# Patient Record
Sex: FEMALE | Race: BLACK OR AFRICAN AMERICAN | Employment: FULL TIME | URBAN - METROPOLITAN AREA
[De-identification: names, ages, dates, MRNs, and addresses within clinical notes are randomized per-mention and may not be internally consistent; named-entity substitution may affect disease eponyms.]

---

## 2019-04-03 PROBLEM — E66.09 CLASS 1 OBESITY DUE TO EXCESS CALORIES WITHOUT SERIOUS COMORBIDITY WITH BODY MASS INDEX (BMI) OF 30.0 TO 30.9 IN ADULT: Status: ACTIVE | Noted: 2019-04-03

## 2019-06-07 ENCOUNTER — HOSPITAL ENCOUNTER (OUTPATIENT)
Dept: MAMMOGRAPHY | Age: 59
Discharge: HOME | End: 2019-06-07
Attending: FAMILY MEDICINE

## 2019-06-07 DIAGNOSIS — Z12.39 SCREENING FOR MALIGNANT NEOPLASM OF BREAST: ICD-10-CM

## 2020-07-31 ENCOUNTER — HOSPITAL ENCOUNTER (OUTPATIENT)
Dept: ULTRASOUND IMAGING | Age: 60
Discharge: HOME | End: 2020-07-31
Attending: FAMILY MEDICINE

## 2020-07-31 DIAGNOSIS — R11.0 NAUSEA: ICD-10-CM

## 2020-07-31 DIAGNOSIS — R10.11 RUQ PAIN: ICD-10-CM

## 2022-03-18 PROBLEM — E66.811 CLASS 1 OBESITY DUE TO EXCESS CALORIES WITHOUT SERIOUS COMORBIDITY WITH BODY MASS INDEX (BMI) OF 30.0 TO 30.9 IN ADULT: Status: ACTIVE | Noted: 2019-04-03

## 2022-03-18 PROBLEM — E66.09 CLASS 1 OBESITY DUE TO EXCESS CALORIES WITHOUT SERIOUS COMORBIDITY WITH BODY MASS INDEX (BMI) OF 30.0 TO 30.9 IN ADULT: Status: ACTIVE | Noted: 2019-04-03

## 2022-06-24 RX ORDER — ESCITALOPRAM OXALATE 20 MG/1
20 TABLET ORAL DAILY
Qty: 30 TABLET | Refills: 0 | Status: SHIPPED | OUTPATIENT
Start: 2022-06-24

## 2023-01-06 ENCOUNTER — OFFICE VISIT (OUTPATIENT)
Dept: FAMILY MEDICINE CLINIC | Facility: CLINIC | Age: 63
End: 2023-01-06
Payer: COMMERCIAL

## 2023-01-06 VITALS
WEIGHT: 190.7 LBS | SYSTOLIC BLOOD PRESSURE: 136 MMHG | OXYGEN SATURATION: 98 % | BODY MASS INDEX: 31.77 KG/M2 | DIASTOLIC BLOOD PRESSURE: 86 MMHG | HEART RATE: 98 BPM | RESPIRATION RATE: 16 BRPM | TEMPERATURE: 98 F | HEIGHT: 65 IN

## 2023-01-06 DIAGNOSIS — J01.00 ACUTE MAXILLARY SINUSITIS, RECURRENCE NOT SPECIFIED: ICD-10-CM

## 2023-01-06 DIAGNOSIS — R05.3 PERSISTENT COUGH: Primary | ICD-10-CM

## 2023-01-06 LAB
EXP DATE SOLUTION: NORMAL
EXP DATE SWAB: NORMAL
EXPIRATION DATE: NORMAL
INFLUENZA A ANTIGEN, POC: NEGATIVE
INFLUENZA B ANTIGEN, POC: NEGATIVE
LOT NUMBER POC: NORMAL
LOT NUMBER SOLUTION: NORMAL
LOT NUMBER SWAB: NORMAL
SARS-COV-2 RNA, POC: NEGATIVE
VALID INTERNAL CONTROL, POC: YES

## 2023-01-06 PROCEDURE — 87635 SARS-COV-2 COVID-19 AMP PRB: CPT | Performed by: PHYSICIAN ASSISTANT

## 2023-01-06 PROCEDURE — 99213 OFFICE O/P EST LOW 20 MIN: CPT | Performed by: PHYSICIAN ASSISTANT

## 2023-01-06 PROCEDURE — 87804 INFLUENZA ASSAY W/OPTIC: CPT | Performed by: PHYSICIAN ASSISTANT

## 2023-01-06 RX ORDER — CEFDINIR 300 MG/1
300 CAPSULE ORAL 2 TIMES DAILY
Qty: 20 CAPSULE | Refills: 0 | Status: SHIPPED | OUTPATIENT
Start: 2023-01-06 | End: 2023-01-16

## 2023-01-06 RX ORDER — DEXTROMETHORPHAN HYDROBROMIDE AND PROMETHAZINE HYDROCHLORIDE 15; 6.25 MG/5ML; MG/5ML
5 SYRUP ORAL 4 TIMES DAILY PRN
Qty: 240 ML | Refills: 0 | Status: SHIPPED | OUTPATIENT
Start: 2023-01-06

## 2023-01-06 RX ORDER — ALBUTEROL SULFATE 90 UG/1
2 AEROSOL, METERED RESPIRATORY (INHALATION) EVERY 6 HOURS PRN
Qty: 1 EACH | Refills: 1 | Status: SHIPPED | OUTPATIENT
Start: 2023-01-06

## 2023-01-06 ASSESSMENT — PATIENT HEALTH QUESTIONNAIRE - PHQ9
3. TROUBLE FALLING OR STAYING ASLEEP: 0
2. FEELING DOWN, DEPRESSED OR HOPELESS: 0
SUM OF ALL RESPONSES TO PHQ QUESTIONS 1-9: 0
SUM OF ALL RESPONSES TO PHQ QUESTIONS 1-9: 0
1. LITTLE INTEREST OR PLEASURE IN DOING THINGS: 0
SUM OF ALL RESPONSES TO PHQ QUESTIONS 1-9: 0
4. FEELING TIRED OR HAVING LITTLE ENERGY: 0
SUM OF ALL RESPONSES TO PHQ QUESTIONS 1-9: 0
7. TROUBLE CONCENTRATING ON THINGS, SUCH AS READING THE NEWSPAPER OR WATCHING TELEVISION: 0
10. IF YOU CHECKED OFF ANY PROBLEMS, HOW DIFFICULT HAVE THESE PROBLEMS MADE IT FOR YOU TO DO YOUR WORK, TAKE CARE OF THINGS AT HOME, OR GET ALONG WITH OTHER PEOPLE: 0
8. MOVING OR SPEAKING SO SLOWLY THAT OTHER PEOPLE COULD HAVE NOTICED. OR THE OPPOSITE, BEING SO FIGETY OR RESTLESS THAT YOU HAVE BEEN MOVING AROUND A LOT MORE THAN USUAL: 0
5. POOR APPETITE OR OVEREATING: 0
9. THOUGHTS THAT YOU WOULD BE BETTER OFF DEAD, OR OF HURTING YOURSELF: 0
SUM OF ALL RESPONSES TO PHQ9 QUESTIONS 1 & 2: 0
6. FEELING BAD ABOUT YOURSELF - OR THAT YOU ARE A FAILURE OR HAVE LET YOURSELF OR YOUR FAMILY DOWN: 0

## 2023-01-06 ASSESSMENT — ANXIETY QUESTIONNAIRES
IF YOU CHECKED OFF ANY PROBLEMS ON THIS QUESTIONNAIRE, HOW DIFFICULT HAVE THESE PROBLEMS MADE IT FOR YOU TO DO YOUR WORK, TAKE CARE OF THINGS AT HOME, OR GET ALONG WITH OTHER PEOPLE: NOT DIFFICULT AT ALL
5. BEING SO RESTLESS THAT IT IS HARD TO SIT STILL: 0
7. FEELING AFRAID AS IF SOMETHING AWFUL MIGHT HAPPEN: 0
2. NOT BEING ABLE TO STOP OR CONTROL WORRYING: 0
6. BECOMING EASILY ANNOYED OR IRRITABLE: 0
4. TROUBLE RELAXING: 0
3. WORRYING TOO MUCH ABOUT DIFFERENT THINGS: 0
GAD7 TOTAL SCORE: 0
1. FEELING NERVOUS, ANXIOUS, OR ON EDGE: 0

## 2023-01-06 ASSESSMENT — ENCOUNTER SYMPTOMS
SORE THROAT: 0
SINUS PRESSURE: 1
EYE DISCHARGE: 0
SINUS PAIN: 1
SHORTNESS OF BREATH: 0
CHEST TIGHTNESS: 1
COUGH: 1
WHEEZING: 0
RHINORRHEA: 1

## 2023-01-06 NOTE — PATIENT INSTRUCTIONS
*Take the antibiotic (Cefdinir) as prescribed. I recommend Tylenol for fever or pain. Take the Promethazine DM for cough and congestion. Consider use of over the counter Flonase - 2 sprays per nostril once a day. If sore throat is present, may mix 1/2 to 1 tsp of table salt in 8oz of warm water and gargle every 1-2 hours. Do not swallow the salt water, it could cause nausea. Make sure to wash hands frequently. Please return if symptoms don't improve or if worsening symptoms such as shortness of breath, fever > 102 or if feeling much worse. *You can try the albuterol inhaler as needed for cough and tightness in the chest.  Take 2 puffs every 6 hours as needed.

## 2023-01-06 NOTE — LETTER
[facility]  [address] [address]  Phone: [phone]  Fax: [phone]    Eugenio Magaña PA-C        January 6, 2023     Patient: Jaswinder Perkins   YOB: 1960   Date of Visit: 1/6/2023       To Whom It May Concern: It is my medical opinion that [facility] may return to work on 1/6/23. Please excuse for time missed due to illness. If you have any questions or concerns, please don't hesitate to call.     Sincerely,        Eugenio Magaña PA-C

## 2023-01-06 NOTE — PROGRESS NOTES
[facility] [address]  Phone [phone]      Patient: Daren Bearden  YOB: 1960  Age 58 y.o. Sex female  Medical Record:  [id number]  Visit Date: 01/06/23  Author:  Jason Badillo PA-C    Family Practice Clinic Note    Chief Complaint   Patient presents with    Cough    Congestion     Started about 2 weeks before Danielle. Went to ED ([address] [facility]) got better then went to [place] and came back sick. No flu or covid test    Other     Pt requesting an inhaler       History of Present Illness  This is a 70-year-old female who presents today with complaints of respiratory symptoms which been present now for approximately 5 weeks. She states that she was seen at emergency room 11/28/2022 when the symptoms first started. She had a sore throat at that time and was treated for suspected strep pharyngitis. She took the penicillin that was prescribed and notes that her sore throat improved but the rest of her respiratory symptoms persisted. She is continue to have nasal congestion, rhinorrhea, postnasal drip, sinus pressure and is experiencing worsening sinus pain. Denies bloody nasal discharge. No ear pain or discharge from the [address] Denies fever or chills. She denies chest congestion but has had cough which she attributes to postnasal drip. Occasionally productive. She denies shortness of breath or wheeze but states that she does get a little \"tight\" with her cough. She is requesting an albuterol inhaler. Denies history of asthma or COPD. Does not smoke.     Past History:    Past Medical history   Past Medical History:   Diagnosis Date    Depression     GERD (gastroesophageal reflux disease)     Migraines, neuralgic        Current Problem List:   Patient Active Problem List   Diagnosis    Class 1 obesity due to excess calories without serious comorbidity with body mass index (BMI) of 30.0 to 30.9 in adult    Acid reflux    High cholesterol Migraine       Current Medications: . Current Outpatient Medications   Medication Sig Dispense Refill    cefdinir (OMNICEF) 300 MG capsule Take 1 capsule by mouth 2 times daily for 10 days 20 capsule 0    promethazine-dextromethorphan (PROMETHAZINE-DM) 6.25-15 MG/5ML syrup Take 5 mLs by mouth 4 times daily as needed for Cough 240 mL 0    albuterol sulfate HFA (PROVENTIL HFA) 108 (90 Base) MCG/ACT inhaler Inhale 2 puffs into the lungs every 6 hours as needed for Wheezing 1 each 1    escitalopram (LEXAPRO) 20 MG tablet Take 1 tablet by mouth daily 30 tablet 0    diclofenac (VOLTAREN) 75 MG EC tablet Take 75 mg by mouth 2 times daily      omeprazole (PRILOSEC) 40 MG delayed release capsule TAKE ONE CAPSULE BY MOUTH EVERY DAY      promethazine (PHENERGAN) 25 MG tablet Take 25 mg by mouth every 4 hours as needed      traZODone (DESYREL) 50 MG tablet Take 50 mg by mouth (Patient not taking: Reported on 1/6/2023)       No current facility-administered medications for this visit. Allergies:   Allergies   Allergen Reactions    Codeine Swelling     throat    Sumatriptan Anaphylaxis    Famotidine Nausea Only    Hydrocodone-Acetaminophen Nausea And Vomiting       Surgical History:  Past Surgical History:   Procedure Laterality Date    HYSTERECTOMY (CERVIX STATUS UNKNOWN)      partial 2007, complete 2008    ORTHOPEDIC SURGERY      bilateral foot surgery       Family History:  Family History   Problem Relation Age of Onset    Asthma Sister     Stroke Sister     Delayed Awakening Sister     Hypertension Mother     Heart Attack Father     Heart Disease Father     Stroke Mother         also has bad PVD       Social History:   Social History     Social History Narrative    Alejandra Magaña' daughter      Social History     Socioeconomic History    Marital status:      Spouse name: Not on file    Number of children: Not on file    Years of education: Not on file    Highest education level: Not on file   Occupational History    Not on file   Tobacco Use    Smoking status: Never    Smokeless tobacco: Never   Substance and Sexual Activity    Alcohol use: No     Alcohol/week: 0.0 standard drinks    Drug use: Never    Sexual activity: Not on file   Other Topics Concern    Not on file   Social History Narrative    Alejandra Castanon daughter     Social Determinants of Health     Financial Resource Strain: Not on file   Food Insecurity: Not on file   Transportation Needs: Not on file   Physical Activity: Not on file   Stress: Not on file   Social Connections: Not on file   Intimate Partner Violence: Not on file   Housing Stability: Not on file         ROS  Review of Systems   Constitutional:  Positive for fatigue. Negative for chills and fever. HENT:  Positive for congestion, postnasal drip, rhinorrhea, sinus pressure and sinus pain. Negative for ear discharge, ear pain and sore throat. Eyes:  Negative for discharge. Respiratory:  Positive for cough and chest tightness. Negative for shortness of breath and wheezing. Cardiovascular:  Negative for chest pain, palpitations and leg swelling. Musculoskeletal:  Negative for myalgias. Neurological:  Negative for dizziness, light-headedness and headaches. /86 (Site: Right Upper Arm)   Pulse 98   Temp 98 °F (36.7 °C) (Temporal)   Resp 16   Ht 5' 4.5\" (1.638 m)   Wt 190 lb 11.2 oz (86.5 kg)   SpO2 98%   BMI 32.23 kg/m²   Body mass index is 32.23 kg/m². Physical Exam    Physical Exam  Vitals and nursing note reviewed. Constitutional:       General: She is not in acute distress. Appearance: She is not toxic-appearing. HENT:      Head: Normocephalic. Right Ear: Tympanic membrane, ear canal and external ear normal.      Left Ear: Tympanic membrane, ear canal and external ear normal.      Nose: Congestion present. Right Sinus: Maxillary sinus tenderness present. No frontal sinus tenderness. Left Sinus: Maxillary sinus tenderness present.  No frontal sinus tenderness. Mouth/Throat:      Pharynx: Oropharynx is clear. Eyes:      Conjunctiva/sclera: Conjunctivae normal.   Cardiovascular:      Rate and Rhythm: Normal rate and regular rhythm. Heart sounds: Normal heart sounds. Pulmonary:      Effort: Pulmonary effort is normal.      Breath sounds: Normal breath sounds. No wheezing, rhonchi or rales. Musculoskeletal:      Cervical back: Neck supple. Lymphadenopathy:      Cervical: No cervical adenopathy. Neurological:      Mental Status: She is alert. Psychiatric:         Mood and Affect: Mood normal.         Behavior: Behavior normal.         Thought Content: Thought content normal.     Component      Latest Ref Rng & Units 1/6/2023 1/6/2023           8:33 AM  8:33 AM   Valid Internal Control, POC        Yes   Influenza A Antigen, POC      Negative  Negative   Influenza B Antigen, POC      Negative  Negative   SARS-COV-2 RNA, POC       Negative        ASSESSMENT & PLAN    ICD-10-CM    1. Persistent cough  R05.3 AMB POC RAPID INFLUENZA TEST     AMB POC COVID-19 COV     promethazine-dextromethorphan (PROMETHAZINE-DM) 6.25-15 MG/5ML syrup     albuterol sulfate HFA (PROVENTIL HFA) 108 (90 Base) MCG/ACT inhaler      2. Acute maxillary sinusitis, recurrence not specified  J01.00 cefdinir (OMNICEF) 300 MG capsule           1. Persistent cough  *Rapid influenza and rapid COVID test were both negative. *Use Promethazine DM as needed. *May use albuterol inhaler as needed. - AMB POC RAPID INFLUENZA TEST  - AMB POC COVID-19 COV  - promethazine-dextromethorphan (PROMETHAZINE-DM) 6.25-15 MG/5ML syrup; Take 5 mLs by mouth 4 times daily as needed for Cough  Dispense: 240 mL; Refill: 0  - albuterol sulfate HFA (PROVENTIL HFA) 108 (90 Base) MCG/ACT inhaler; Inhale 2 puffs into the lungs every 6 hours as needed for Wheezing  Dispense: 1 each; Refill: 1    2.  Acute maxillary sinusitis, recurrence not specified  *Discussed that this is most likely a sinus infection. This can take a few weeks to fully resolve. Take the antibiotic (Cefdinir) as prescribed. I recommend Tylenol for fever or pain. Take the Promethazine DM for cough and congestion. Consider use of over the counter Flonase - 2 sprays per nostril once a day. If sore throat is present, may mix 1/2 to 1 tsp of table salt in 8oz of warm water and gargle every 1-2 hours. Do not swallow the salt water, it could cause nausea. Make sure to wash hands frequently. Please return if symptoms don't improve or if worsening symptoms such as shortness of breath, fever > 102 or if feeling much worse. - cefdinir (OMNICEF) 300 MG capsule; Take 1 capsule by mouth 2 times daily for 10 days  Dispense: 20 capsule; Refill: 0      Orders Placed This Encounter   Procedures    AMB POC RAPID INFLUENZA TEST    AMB POC COVID-19 COV     Order Specific Question:   Pregnant? Answer:   No     I have reviewed the patient's past medical history, social history and family history and vitals. We have discussed treatment plan and follow up and given patient instructions. Patient's questions are answered and we will follow up as indicated. Dictated using voice recognition software. Proof read but unrecognized errors may exist.    Follow-up and Dispositions    Return if symptoms worsen or fail to improve.          Buck Salazar PA-C

## 2023-03-27 ENCOUNTER — OFFICE VISIT (OUTPATIENT)
Dept: FAMILY MEDICINE CLINIC | Facility: CLINIC | Age: 63
End: 2023-03-27
Payer: COMMERCIAL

## 2023-03-27 VITALS
RESPIRATION RATE: 16 BRPM | TEMPERATURE: 97.9 F | OXYGEN SATURATION: 100 % | BODY MASS INDEX: 31.32 KG/M2 | WEIGHT: 188 LBS | SYSTOLIC BLOOD PRESSURE: 132 MMHG | DIASTOLIC BLOOD PRESSURE: 76 MMHG | HEIGHT: 65 IN | HEART RATE: 74 BPM

## 2023-03-27 DIAGNOSIS — J01.00 ACUTE MAXILLARY SINUSITIS, RECURRENCE NOT SPECIFIED: Primary | ICD-10-CM

## 2023-03-27 PROCEDURE — 99213 OFFICE O/P EST LOW 20 MIN: CPT | Performed by: PHYSICIAN ASSISTANT

## 2023-03-27 RX ORDER — CEFDINIR 300 MG/1
300 CAPSULE ORAL 2 TIMES DAILY
Qty: 20 CAPSULE | Refills: 0 | Status: SHIPPED | OUTPATIENT
Start: 2023-03-27 | End: 2023-04-06

## 2023-03-27 RX ORDER — ALBUTEROL SULFATE 90 UG/1
2 AEROSOL, METERED RESPIRATORY (INHALATION) EVERY 6 HOURS PRN
Qty: 1 EACH | Refills: 1 | Status: SHIPPED | OUTPATIENT
Start: 2023-03-27

## 2023-03-27 RX ORDER — FLUTICASONE PROPIONATE 50 MCG
SPRAY, SUSPENSION (ML) NASAL
Qty: 1 EACH | Refills: 0 | Status: SHIPPED | OUTPATIENT
Start: 2023-03-27

## 2023-03-27 SDOH — ECONOMIC STABILITY: INCOME INSECURITY: HOW HARD IS IT FOR YOU TO PAY FOR THE VERY BASICS LIKE FOOD, HOUSING, MEDICAL CARE, AND HEATING?: NOT HARD AT ALL

## 2023-03-27 SDOH — ECONOMIC STABILITY: FOOD INSECURITY: WITHIN THE PAST 12 MONTHS, YOU WORRIED THAT YOUR FOOD WOULD RUN OUT BEFORE YOU GOT MONEY TO BUY MORE.: NEVER TRUE

## 2023-03-27 SDOH — ECONOMIC STABILITY: FOOD INSECURITY: WITHIN THE PAST 12 MONTHS, THE FOOD YOU BOUGHT JUST DIDN'T LAST AND YOU DIDN'T HAVE MONEY TO GET MORE.: NEVER TRUE

## 2023-03-27 SDOH — ECONOMIC STABILITY: HOUSING INSECURITY
IN THE LAST 12 MONTHS, WAS THERE A TIME WHEN YOU DID NOT HAVE A STEADY PLACE TO SLEEP OR SLEPT IN A SHELTER (INCLUDING NOW)?: NO

## 2023-03-27 ASSESSMENT — PATIENT HEALTH QUESTIONNAIRE - PHQ9
SUM OF ALL RESPONSES TO PHQ QUESTIONS 1-9: 0
4. FEELING TIRED OR HAVING LITTLE ENERGY: 0
SUM OF ALL RESPONSES TO PHQ QUESTIONS 1-9: 0
3. TROUBLE FALLING OR STAYING ASLEEP: 0
9. THOUGHTS THAT YOU WOULD BE BETTER OFF DEAD, OR OF HURTING YOURSELF: 0
SUM OF ALL RESPONSES TO PHQ QUESTIONS 1-9: 0
10. IF YOU CHECKED OFF ANY PROBLEMS, HOW DIFFICULT HAVE THESE PROBLEMS MADE IT FOR YOU TO DO YOUR WORK, TAKE CARE OF THINGS AT HOME, OR GET ALONG WITH OTHER PEOPLE: 0
1. LITTLE INTEREST OR PLEASURE IN DOING THINGS: 0
SUM OF ALL RESPONSES TO PHQ QUESTIONS 1-9: 0
6. FEELING BAD ABOUT YOURSELF - OR THAT YOU ARE A FAILURE OR HAVE LET YOURSELF OR YOUR FAMILY DOWN: 0
2. FEELING DOWN, DEPRESSED OR HOPELESS: 0
8. MOVING OR SPEAKING SO SLOWLY THAT OTHER PEOPLE COULD HAVE NOTICED. OR THE OPPOSITE, BEING SO FIGETY OR RESTLESS THAT YOU HAVE BEEN MOVING AROUND A LOT MORE THAN USUAL: 0
5. POOR APPETITE OR OVEREATING: 0
7. TROUBLE CONCENTRATING ON THINGS, SUCH AS READING THE NEWSPAPER OR WATCHING TELEVISION: 0
SUM OF ALL RESPONSES TO PHQ9 QUESTIONS 1 & 2: 0

## 2023-03-27 ASSESSMENT — ANXIETY QUESTIONNAIRES
5. BEING SO RESTLESS THAT IT IS HARD TO SIT STILL: 0
IF YOU CHECKED OFF ANY PROBLEMS ON THIS QUESTIONNAIRE, HOW DIFFICULT HAVE THESE PROBLEMS MADE IT FOR YOU TO DO YOUR WORK, TAKE CARE OF THINGS AT HOME, OR GET ALONG WITH OTHER PEOPLE: NOT DIFFICULT AT ALL
7. FEELING AFRAID AS IF SOMETHING AWFUL MIGHT HAPPEN: 0
4. TROUBLE RELAXING: 0
GAD7 TOTAL SCORE: 0
3. WORRYING TOO MUCH ABOUT DIFFERENT THINGS: 0
2. NOT BEING ABLE TO STOP OR CONTROL WORRYING: 0
1. FEELING NERVOUS, ANXIOUS, OR ON EDGE: 0
6. BECOMING EASILY ANNOYED OR IRRITABLE: 0

## 2023-03-27 ASSESSMENT — ENCOUNTER SYMPTOMS
EYE DISCHARGE: 0
CHEST TIGHTNESS: 0
SINUS PAIN: 1
SORE THROAT: 0
SINUS PRESSURE: 1
COUGH: 0
RHINORRHEA: 1
WHEEZING: 0
SHORTNESS OF BREATH: 0

## 2023-03-27 NOTE — PATIENT INSTRUCTIONS
*Take the antibiotic [facility]) as prescribed. Recommend Tylenol for fever or pain, Mucinex DM, over the counter, for cough and congestion. Add the Flonase - 2 sprays per nostril once a day. If sore throat is present, may mix 1/2 to 1 tsp of table salt in 8oz of warm water and gargle every 1-2 hours. Do not swallow the salt water, it could cause nausea. Make sure to wash hands frequently. Please return if symptoms don't improve or if they worsen.

## 2023-03-27 NOTE — PROGRESS NOTES
Neurological:      Mental Status: She is alert. Psychiatric:         Mood and Affect: Mood normal.         Behavior: Behavior normal.         Thought Content: Thought content normal.       ASSESSMENT & PLAN    ICD-10-CM    1. Acute maxillary sinusitis, recurrence not specified  J01.00 cefdinir (OMNICEF) 300 MG capsule     fluticasone (FLONASE) 50 MCG/ACT nasal spray           1. Acute maxillary sinusitis, recurrence not specified  *Suspect acute sinusitis. Take the antibiotic [facility]) as prescribed. Recommend Tylenol for fever or pain, Mucinex DM, over the counter, for cough and congestion. Add the Flonase - 2 sprays per nostril once a day. If sore throat is present, may mix 1/2 to 1 tsp of table salt in 8oz of warm water and gargle every 1-2 hours. Do not swallow the salt water, it could cause nausea. Make sure to wash hands frequently. Please return if symptoms don't improve or if they worsen. - cefdinir (OMNICEF) 300 MG capsule; Take 1 capsule by mouth 2 times daily for 10 days  Dispense: 20 capsule; Refill: 0  - fluticasone (FLONASE) 50 MCG/ACT nasal spray; 2 sprays in each nostril once a day. Dispense: 1 each; Refill: 0      No orders of the defined types were placed in this encounter. I have reviewed the patient's past medical history, social history and family history and vitals. We have discussed treatment plan and follow up and given patient instructions. Patient's questions are answered and we will follow up as indicated. Dictated using voice recognition software. Proof read but unrecognized errors may exist.    Follow-up and Dispositions    Return if symptoms worsen or fail to improve.          Karen Garcia PA-C

## 2023-04-05 ENCOUNTER — OFFICE VISIT (OUTPATIENT)
Dept: FAMILY MEDICINE CLINIC | Facility: CLINIC | Age: 63
End: 2023-04-05
Payer: COMMERCIAL

## 2023-04-05 VITALS
BODY MASS INDEX: 31.12 KG/M2 | WEIGHT: 186.8 LBS | OXYGEN SATURATION: 98 % | RESPIRATION RATE: 16 BRPM | HEIGHT: 65 IN | TEMPERATURE: 97.8 F | HEART RATE: 102 BPM | SYSTOLIC BLOOD PRESSURE: 114 MMHG | DIASTOLIC BLOOD PRESSURE: 82 MMHG

## 2023-04-05 DIAGNOSIS — J01.00 ACUTE NON-RECURRENT MAXILLARY SINUSITIS: Primary | ICD-10-CM

## 2023-04-05 PROCEDURE — 99213 OFFICE O/P EST LOW 20 MIN: CPT | Performed by: FAMILY MEDICINE

## 2023-04-05 RX ORDER — AZITHROMYCIN 250 MG/1
TABLET, FILM COATED ORAL
Qty: 1 PACKET | Refills: 0 | Status: SHIPPED | OUTPATIENT
Start: 2023-04-05

## 2023-04-05 SDOH — ECONOMIC STABILITY: FOOD INSECURITY: WITHIN THE PAST 12 MONTHS, THE FOOD YOU BOUGHT JUST DIDN'T LAST AND YOU DIDN'T HAVE MONEY TO GET MORE.: NEVER TRUE

## 2023-04-05 SDOH — ECONOMIC STABILITY: INCOME INSECURITY: HOW HARD IS IT FOR YOU TO PAY FOR THE VERY BASICS LIKE FOOD, HOUSING, MEDICAL CARE, AND HEATING?: NOT HARD AT ALL

## 2023-04-05 SDOH — ECONOMIC STABILITY: FOOD INSECURITY: WITHIN THE PAST 12 MONTHS, YOU WORRIED THAT YOUR FOOD WOULD RUN OUT BEFORE YOU GOT MONEY TO BUY MORE.: NEVER TRUE

## 2023-04-05 ASSESSMENT — ANXIETY QUESTIONNAIRES
3. WORRYING TOO MUCH ABOUT DIFFERENT THINGS: 0
5. BEING SO RESTLESS THAT IT IS HARD TO SIT STILL: 0
1. FEELING NERVOUS, ANXIOUS, OR ON EDGE: 0
2. NOT BEING ABLE TO STOP OR CONTROL WORRYING: 0
4. TROUBLE RELAXING: 0
GAD7 TOTAL SCORE: 0
6. BECOMING EASILY ANNOYED OR IRRITABLE: 0
7. FEELING AFRAID AS IF SOMETHING AWFUL MIGHT HAPPEN: 0
IF YOU CHECKED OFF ANY PROBLEMS ON THIS QUESTIONNAIRE, HOW DIFFICULT HAVE THESE PROBLEMS MADE IT FOR YOU TO DO YOUR WORK, TAKE CARE OF THINGS AT HOME, OR GET ALONG WITH OTHER PEOPLE: NOT DIFFICULT AT ALL

## 2023-04-05 ASSESSMENT — PATIENT HEALTH QUESTIONNAIRE - PHQ9
6. FEELING BAD ABOUT YOURSELF - OR THAT YOU ARE A FAILURE OR HAVE LET YOURSELF OR YOUR FAMILY DOWN: 0
SUM OF ALL RESPONSES TO PHQ QUESTIONS 1-9: 0
7. TROUBLE CONCENTRATING ON THINGS, SUCH AS READING THE NEWSPAPER OR WATCHING TELEVISION: 0
4. FEELING TIRED OR HAVING LITTLE ENERGY: 0
SUM OF ALL RESPONSES TO PHQ QUESTIONS 1-9: 0
SUM OF ALL RESPONSES TO PHQ QUESTIONS 1-9: 0
10. IF YOU CHECKED OFF ANY PROBLEMS, HOW DIFFICULT HAVE THESE PROBLEMS MADE IT FOR YOU TO DO YOUR WORK, TAKE CARE OF THINGS AT HOME, OR GET ALONG WITH OTHER PEOPLE: 0
SUM OF ALL RESPONSES TO PHQ QUESTIONS 1-9: 0
9. THOUGHTS THAT YOU WOULD BE BETTER OFF DEAD, OR OF HURTING YOURSELF: 0
8. MOVING OR SPEAKING SO SLOWLY THAT OTHER PEOPLE COULD HAVE NOTICED. OR THE OPPOSITE, BEING SO FIGETY OR RESTLESS THAT YOU HAVE BEEN MOVING AROUND A LOT MORE THAN USUAL: 0
2. FEELING DOWN, DEPRESSED OR HOPELESS: 0
5. POOR APPETITE OR OVEREATING: 0
1. LITTLE INTEREST OR PLEASURE IN DOING THINGS: 0
3. TROUBLE FALLING OR STAYING ASLEEP: 0
SUM OF ALL RESPONSES TO PHQ9 QUESTIONS 1 & 2: 0

## 2023-04-05 NOTE — PROGRESS NOTES
Resource Strain: Low Risk     Difficulty of Paying Living Expenses: Not hard at all   Food Insecurity: No Food Insecurity    Worried About Running Out of Food in the Last Year: Never true    Ran Out of Food in the Last Year: Never true   Transportation Needs: Unknown    Lack of Transportation (Non-Medical): No   Housing Stability: Unknown    Unstable Housing in the Last Year: No       Ms. Marimar Torres   Family History   Problem Relation Age of Onset    Asthma Sister     Stroke Sister     Delayed Awakening Sister     Hypertension Mother     Heart Attack Father     Heart Disease Father     Stroke Mother         also has bad PVD            Ms. Marimar Torres  has the following allergies: Allergies   Allergen Reactions    Codeine Swelling     throat    Sumatriptan Anaphylaxis    Famotidine Nausea Only    Hydrocodone-Acetaminophen Nausea And Vomiting       /82 (Site: Left Upper Arm)   Pulse (!) 102   Temp 97.8 °F (36.6 °C) (Temporal)   Resp 16   Ht 5' 4.5\" (1.638 m)   Wt 186 lb 12.8 oz (84.7 kg)   SpO2 98%   BMI 31.57 kg/m²     HEENT: Normocephalic, atraumatic, pupils equal and reactive to light. Tympanic membranes intact without erythema or edema. Oropharynx clear. Neck: Supple, no masses or thyromegaly. Lungs: clear to auscultation bilaterally. CV: regular rate and rhythm, without murmurs, rubs, or gallops  Ext: No lower extremity edema. Yaw Benavides was seen today for congestion. Diagnoses and all orders for this visit:    Acute non-recurrent maxillary sinusitis  -     azithromycin (ZITHROMAX) 250 MG tablet; 2 po the first day then one po qd x 4 days    Stop Omnicef. Try Zithromax. If not better in 1 week she is to follow-up. Would like her to follow-up later in the summer for complete physical including blood work.     Lexi Orozco MD

## 2023-04-05 NOTE — LETTER
April 5, 2023       Adriel Taylor YOB: 1960   Ramy Date of Visit:  4/5/2023       To Whom It May Concern: It is my medical opinion that Riki Londono be excused from work today and tomorrow. If you have any questions or concerns, please don't hesitate to call.     Sincerely,        Leigh Brewster MD

## 2023-04-21 DIAGNOSIS — J01.00 ACUTE MAXILLARY SINUSITIS, RECURRENCE NOT SPECIFIED: ICD-10-CM

## 2023-04-21 RX ORDER — FLUTICASONE PROPIONATE 50 MCG
SPRAY, SUSPENSION (ML) NASAL
Qty: 3 EACH | Refills: 3 | Status: CANCELLED | OUTPATIENT
Start: 2023-04-21

## 2023-04-21 RX ORDER — OMEPRAZOLE 40 MG/1
CAPSULE, DELAYED RELEASE ORAL
Qty: 90 CAPSULE | Refills: 1 | Status: SHIPPED | OUTPATIENT
Start: 2023-04-21

## 2023-05-27 DIAGNOSIS — J01.00 ACUTE MAXILLARY SINUSITIS, RECURRENCE NOT SPECIFIED: ICD-10-CM

## 2023-05-30 RX ORDER — FLUTICASONE PROPIONATE 50 MCG
SPRAY, SUSPENSION (ML) NASAL
OUTPATIENT
Start: 2023-05-30

## 2023-07-14 ENCOUNTER — OFFICE VISIT (OUTPATIENT)
Dept: FAMILY MEDICINE CLINIC | Facility: CLINIC | Age: 63
End: 2023-07-14
Payer: COMMERCIAL

## 2023-07-14 VITALS
WEIGHT: 184 LBS | HEIGHT: 65 IN | OXYGEN SATURATION: 97 % | DIASTOLIC BLOOD PRESSURE: 81 MMHG | BODY MASS INDEX: 30.66 KG/M2 | TEMPERATURE: 97.1 F | SYSTOLIC BLOOD PRESSURE: 155 MMHG | HEART RATE: 96 BPM | RESPIRATION RATE: 16 BRPM

## 2023-07-14 DIAGNOSIS — Z12.31 ENCOUNTER FOR SCREENING MAMMOGRAM FOR MALIGNANT NEOPLASM OF BREAST: ICD-10-CM

## 2023-07-14 DIAGNOSIS — K21.9 GASTROESOPHAGEAL REFLUX DISEASE WITHOUT ESOPHAGITIS: Primary | ICD-10-CM

## 2023-07-14 DIAGNOSIS — E78.00 HIGH CHOLESTEROL: ICD-10-CM

## 2023-07-14 DIAGNOSIS — R11.0 NAUSEA: ICD-10-CM

## 2023-07-14 DIAGNOSIS — F33.1 MODERATE EPISODE OF RECURRENT MAJOR DEPRESSIVE DISORDER (HCC): ICD-10-CM

## 2023-07-14 DIAGNOSIS — Z12.11 SCREENING FOR COLON CANCER: ICD-10-CM

## 2023-07-14 LAB
ALBUMIN SERPL-MCNC: 4 G/DL (ref 3.2–4.6)
ALBUMIN/GLOB SERPL: 1.1 (ref 0.4–1.6)
ALP SERPL-CCNC: 104 U/L (ref 50–136)
ALT SERPL-CCNC: 22 U/L (ref 12–65)
ANION GAP SERPL CALC-SCNC: 9 MMOL/L (ref 2–11)
AST SERPL-CCNC: 13 U/L (ref 15–37)
BILIRUB SERPL-MCNC: 0.4 MG/DL (ref 0.2–1.1)
BUN SERPL-MCNC: 14 MG/DL (ref 8–23)
CALCIUM SERPL-MCNC: 9.6 MG/DL (ref 8.3–10.4)
CHLORIDE SERPL-SCNC: 111 MMOL/L (ref 101–110)
CHOLEST SERPL-MCNC: 290 MG/DL
CO2 SERPL-SCNC: 24 MMOL/L (ref 21–32)
CREAT SERPL-MCNC: 0.8 MG/DL (ref 0.6–1)
GLOBULIN SER CALC-MCNC: 3.5 G/DL (ref 2.8–4.5)
GLUCOSE SERPL-MCNC: 82 MG/DL (ref 65–100)
HDLC SERPL-MCNC: 61 MG/DL (ref 40–60)
HDLC SERPL: 4.8
LDLC SERPL CALC-MCNC: 207.2 MG/DL
POTASSIUM SERPL-SCNC: 4 MMOL/L (ref 3.5–5.1)
PROT SERPL-MCNC: 7.5 G/DL (ref 6.3–8.2)
SODIUM SERPL-SCNC: 144 MMOL/L (ref 133–143)
TRIGL SERPL-MCNC: 109 MG/DL (ref 35–150)
VLDLC SERPL CALC-MCNC: 21.8 MG/DL (ref 6–23)

## 2023-07-14 PROCEDURE — 99214 OFFICE O/P EST MOD 30 MIN: CPT | Performed by: FAMILY MEDICINE

## 2023-07-14 RX ORDER — PROMETHAZINE HYDROCHLORIDE 25 MG/1
25 TABLET ORAL EVERY 4 HOURS PRN
Qty: 20 TABLET | Refills: 5 | Status: SHIPPED | OUTPATIENT
Start: 2023-07-14

## 2023-07-14 RX ORDER — ESCITALOPRAM OXALATE 20 MG/1
20 TABLET ORAL DAILY
Qty: 90 TABLET | Refills: 3 | Status: SHIPPED | OUTPATIENT
Start: 2023-07-14

## 2023-07-14 RX ORDER — FLUTICASONE PROPIONATE 50 MCG
SPRAY, SUSPENSION (ML) NASAL
Qty: 1 EACH | Refills: 11 | Status: SHIPPED | OUTPATIENT
Start: 2023-07-14

## 2023-07-14 RX ORDER — OMEPRAZOLE 40 MG/1
CAPSULE, DELAYED RELEASE ORAL
Qty: 90 CAPSULE | Refills: 3 | Status: SHIPPED | OUTPATIENT
Start: 2023-07-14

## 2023-07-14 RX ORDER — ALBUTEROL SULFATE 90 UG/1
2 AEROSOL, METERED RESPIRATORY (INHALATION) EVERY 6 HOURS PRN
Qty: 1 EACH | Refills: 3 | Status: SHIPPED | OUTPATIENT
Start: 2023-07-14

## 2023-07-14 NOTE — PROGRESS NOTES
[address], [address] [place] [address]  Phone: [phone] Fax [phone]  Jacque Brumfield MD  7/14/2023           Ms. Zach Briones  is a 58y.o.  year old  female patient who comes in complaining of being down and blue. She has been out of her Lexapro for a while and feels like she needs to get back on it. She has struggled with depression in the past.  She also needs a reflux medicine refill and blood work done that. Her cholesterol is also been high in the past and she would like that rechecked. She needs medication for nausea also. Ms. Zach Briones  has  has a past medical history of Depression, GERD (gastroesophageal reflux disease), and Migraines, neuralgic. Ms. Zach Briones  has  has a past surgical history that includes Hysterectomy and orthopedic surgery. Ms. Zach Briones   Current Outpatient Medications   Medication Sig Dispense Refill    albuterol sulfate HFA (PROVENTIL HFA) 108 (90 Base) MCG/ACT inhaler Inhale 2 puffs into the lungs every 6 hours as needed for Wheezing 1 each 3    fluticasone (FLONASE) 50 MCG/ACT nasal spray 2 sprays in each nostril once a day. 1 each 11    promethazine (PHENERGAN) 25 MG tablet Take 1 tablet by mouth every 4 hours as needed for Nausea 20 tablet 5    omeprazole (PRILOSEC) 40 MG delayed release capsule TAKE ONE CAPSULE BY MOUTH EVERY DAY 90 capsule 3    escitalopram (LEXAPRO) 20 MG tablet Take 1 tablet by mouth daily 90 tablet 3     No current facility-administered medications for this visit.        Ms. Miles Jackson History     Socioeconomic History    Marital status:      Spouse name: None    Number of children: None    Years of education: None    Highest education level: None   Tobacco Use    Smoking status: Never    Smokeless tobacco: Never   Substance and Sexual Activity    Alcohol use: No     Alcohol/week: 0.0 standard drinks    Drug use: Never   Social History Narrative    Cookson Gu' daughter     Social

## 2023-11-13 ENCOUNTER — TELEPHONE (OUTPATIENT)
Dept: FAMILY MEDICINE CLINIC | Facility: CLINIC | Age: 63
End: 2023-11-13

## 2023-11-13 RX ORDER — FLUOXETINE HYDROCHLORIDE 20 MG/1
20 CAPSULE ORAL DAILY
Qty: 30 CAPSULE | Refills: 3 | Status: SHIPPED | OUTPATIENT
Start: 2023-11-13

## 2023-11-13 NOTE — TELEPHONE ENCOUNTER
Pt called to let know she has stopped the lexapro because she started shaking in her hands. Since she has stopped it the shakes have stopped. Wanting something else called in besides the lexapro. Please send to [facility] on [address].

## 2024-04-19 RX ORDER — FLUOXETINE HYDROCHLORIDE 20 MG/1
20 CAPSULE ORAL DAILY
Qty: 30 CAPSULE | Refills: 3 | Status: SHIPPED | OUTPATIENT
Start: 2024-04-19

## 2024-04-19 RX ORDER — ALBUTEROL SULFATE 90 UG/1
2 AEROSOL, METERED RESPIRATORY (INHALATION) EVERY 6 HOURS PRN
Qty: 1 EACH | Refills: 3 | Status: SHIPPED | OUTPATIENT
Start: 2024-04-19

## 2024-04-19 NOTE — TELEPHONE ENCOUNTER
----- Message from Agnes Forman sent at 4/19/2024  9:46 AM EDT -----  Subject: Refill Request    QUESTIONS  Name of Medication? FLUoxetine (PROZAC) 20 MG capsule  Patient-reported dosage and instructions? 1 tab daily  How many days do you have left? 0  Preferred Pharmacy? [id number]  Pharmacy phone number (if available)? [phone]  ---------------------------------------------------------------------------  --------------,  Name of Medication? albuterol sulfate HFA (PROVENTIL HFA) 108 (90 Base)   MCG/ACT inhaler  Patient-reported dosage and instructions? 2 puffs twice a day as needed  How many days do you have left? 0  Preferred Pharmacy? [id number]  Pharmacy phone number (if available)? [phone]  ---------------------------------------------------------------------------  --------------  CALL BACK INFO  What is the best way for the office to contact you? OK to leave message on   voicemail  Preferred Call Back Phone Number? [phone]  ---------------------------------------------------------------------------  --------------  SCRIPT ANSWERS  Relationship to Patient? Self

## 2024-04-22 ENCOUNTER — TELEPHONE (OUTPATIENT)
Dept: FAMILY MEDICINE CLINIC | Facility: CLINIC | Age: 64
End: 2024-04-22

## 2024-04-22 NOTE — TELEPHONE ENCOUNTER
----- Message from Agnes Forman sent at 4/19/2024  9:47 AM EDT -----  Subject: Referral Request    Reason for referral request? Pt is requesting handicap sticker to be renew  Provider patient wants to be referred to(if known):     Provider Phone Number(if known):    Additional Information for Provider?   ---------------------------------------------------------------------------  --------------  CALL BACK INFO    [phone]; OK to leave message on voicemail  ---------------------------------------------------------------------------  --------------

## 2024-04-22 NOTE — TELEPHONE ENCOUNTER
----- Message from Agnes Forman sent at 4/19/2024  9:47 AM EDT -----  Subject: Referral Request    Reason for referral request? Pt is requesting handicap sticker to be renew  Provider patient wants to be referred to(if known):     Provider Phone Number(if known):    Additional Information for Provider?   ---------------------------------------------------------------------------  --------------  CALL BACK INFO    [phone]; OK to leave message on voicemail  ---------------------------------------------------------------------------  --------------

## 2024-06-27 ENCOUNTER — APPOINTMENT (OUTPATIENT)
Dept: ULTRASOUND IMAGING | Age: 64
End: 2024-06-27
Payer: COMMERCIAL

## 2024-06-27 ENCOUNTER — HOSPITAL ENCOUNTER (EMERGENCY)
Age: 64
Discharge: HOME | End: 2024-06-27
Attending: EMERGENCY MEDICINE
Payer: COMMERCIAL

## 2024-06-27 ENCOUNTER — APPOINTMENT (OUTPATIENT)
Dept: GENERAL RADIOLOGY | Age: 64
End: 2024-06-27
Payer: COMMERCIAL

## 2024-06-27 VITALS
WEIGHT: 180 LBS | HEART RATE: 70 BPM | HEIGHT: 64 IN | BODY MASS INDEX: 30.73 KG/M2 | DIASTOLIC BLOOD PRESSURE: 88 MMHG | OXYGEN SATURATION: 99 % | TEMPERATURE: 98.6 F | SYSTOLIC BLOOD PRESSURE: 155 MMHG | RESPIRATION RATE: 18 BRPM

## 2024-06-27 DIAGNOSIS — M25.511 ACUTE PAIN OF RIGHT SHOULDER: Primary | ICD-10-CM

## 2024-06-27 LAB
ANION GAP SERPL CALC-SCNC: 10 MMOL/L (ref 9–18)
BASOPHILS # BLD: 0 K/UL (ref 0–0.2)
BASOPHILS NFR BLD: 0 % (ref 0–2)
BUN SERPL-MCNC: 17 MG/DL (ref 8–23)
CALCIUM SERPL-MCNC: 9.5 MG/DL (ref 8.8–10.2)
CHLORIDE SERPL-SCNC: 104 MMOL/L (ref 98–107)
CO2 SERPL-SCNC: 25 MMOL/L (ref 20–28)
CREAT SERPL-MCNC: 0.7 MG/DL (ref 0.6–1.1)
DIFFERENTIAL METHOD BLD: ABNORMAL
EOSINOPHIL # BLD: 0.1 K/UL (ref 0–0.8)
EOSINOPHIL NFR BLD: 2 % (ref 0.5–7.8)
ERYTHROCYTE [DISTWIDTH] IN BLOOD BY AUTOMATED COUNT: 14.5 % (ref 11.9–14.6)
ERYTHROCYTE [SEDIMENTATION RATE] IN BLOOD: 12 MM/HR (ref 0–30)
GLUCOSE SERPL-MCNC: 73 MG/DL (ref 70–99)
HCT VFR BLD AUTO: 38.5 % (ref 35.8–46.3)
HGB BLD-MCNC: 12.7 G/DL (ref 11.7–15.4)
IMM GRANULOCYTES # BLD AUTO: 0 K/UL (ref 0–0.5)
IMM GRANULOCYTES NFR BLD AUTO: 0 % (ref 0–5)
LYMPHOCYTES # BLD: 2.9 K/UL (ref 0.5–4.6)
LYMPHOCYTES NFR BLD: 38 % (ref 13–44)
MCH RBC QN AUTO: 24.6 PG (ref 26.1–32.9)
MCHC RBC AUTO-ENTMCNC: 33 G/DL (ref 31.4–35)
MCV RBC AUTO: 74.6 FL (ref 82–102)
MONOCYTES # BLD: 0.5 K/UL (ref 0.1–1.3)
MONOCYTES NFR BLD: 7 % (ref 4–12)
NEUTS SEG # BLD: 3.9 K/UL (ref 1.7–8.2)
NEUTS SEG NFR BLD: 53 % (ref 43–78)
NRBC # BLD: 0 K/UL (ref 0–0.2)
PLATELET # BLD AUTO: 277 K/UL (ref 150–450)
PMV BLD AUTO: 9.5 FL (ref 9.4–12.3)
POTASSIUM SERPL-SCNC: 4.1 MMOL/L (ref 3.5–5.1)
RBC # BLD AUTO: 5.16 M/UL (ref 4.05–5.2)
SODIUM SERPL-SCNC: 140 MMOL/L (ref 136–145)
WBC # BLD AUTO: 7.5 K/UL (ref 4.3–11.1)

## 2024-06-27 PROCEDURE — 6360000002 HC RX W HCPCS: Performed by: EMERGENCY MEDICINE

## 2024-06-27 PROCEDURE — 93971 EXTREMITY STUDY: CPT

## 2024-06-27 PROCEDURE — 6370000000 HC RX 637 (ALT 250 FOR IP): Performed by: EMERGENCY MEDICINE

## 2024-06-27 PROCEDURE — 73030 X-RAY EXAM OF SHOULDER: CPT

## 2024-06-27 PROCEDURE — 36415 COLL VENOUS BLD VENIPUNCTURE: CPT

## 2024-06-27 PROCEDURE — 99284 EMERGENCY DEPT VISIT MOD MDM: CPT

## 2024-06-27 PROCEDURE — 85025 COMPLETE CBC W/AUTO DIFF WBC: CPT

## 2024-06-27 PROCEDURE — 80048 BASIC METABOLIC PNL TOTAL CA: CPT

## 2024-06-27 PROCEDURE — 85652 RBC SED RATE AUTOMATED: CPT

## 2024-06-27 PROCEDURE — 96372 THER/PROPH/DIAG INJ SC/IM: CPT

## 2024-06-27 RX ORDER — KETOROLAC TROMETHAMINE 30 MG/ML
30 INJECTION, SOLUTION INTRAMUSCULAR; INTRAVENOUS
Status: COMPLETED | OUTPATIENT
Start: 2024-06-27 | End: 2024-06-27

## 2024-06-27 RX ORDER — NAPROXEN 375 MG/1
375 TABLET ORAL 2 TIMES DAILY PRN
Qty: 30 TABLET | Refills: 0 | Status: SHIPPED | OUTPATIENT
Start: 2024-06-27

## 2024-06-27 RX ORDER — METHYLPREDNISOLONE 4 MG/1
4 TABLET ORAL ONCE
Status: COMPLETED | OUTPATIENT
Start: 2024-06-27 | End: 2024-06-27

## 2024-06-27 RX ORDER — OXYCODONE HYDROCHLORIDE AND ACETAMINOPHEN 5; 325 MG/1; MG/1
1 TABLET ORAL
Status: COMPLETED | OUTPATIENT
Start: 2024-06-27 | End: 2024-06-27

## 2024-06-27 RX ORDER — METHYLPREDNISOLONE 4 MG/1
TABLET ORAL
Qty: 1 KIT | Refills: 0 | Status: SHIPPED | OUTPATIENT
Start: 2024-06-27 | End: 2024-07-03

## 2024-06-27 RX ADMIN — OXYCODONE HYDROCHLORIDE AND ACETAMINOPHEN 1 TABLET: 5; 325 TABLET ORAL at 19:55

## 2024-06-27 RX ADMIN — METHYLPREDNISOLONE 4 MG: 4 TABLET ORAL at 19:56

## 2024-06-27 RX ADMIN — KETOROLAC TROMETHAMINE 30 MG: 30 INJECTION, SOLUTION INTRAMUSCULAR; INTRAVENOUS at 19:57

## 2024-06-27 ASSESSMENT — PAIN - FUNCTIONAL ASSESSMENT: PAIN_FUNCTIONAL_ASSESSMENT: 0-10

## 2024-06-27 ASSESSMENT — PAIN DESCRIPTION - ORIENTATION
ORIENTATION: RIGHT
ORIENTATION: RIGHT

## 2024-06-27 ASSESSMENT — PAIN SCALES - GENERAL
PAINLEVEL_OUTOF10: 8
PAINLEVEL_OUTOF10: 5

## 2024-06-27 ASSESSMENT — LIFESTYLE VARIABLES
HOW MANY STANDARD DRINKS CONTAINING ALCOHOL DO YOU HAVE ON A TYPICAL DAY: PATIENT DOES NOT DRINK
HOW OFTEN DO YOU HAVE A DRINK CONTAINING ALCOHOL: NEVER

## 2024-06-27 ASSESSMENT — PAIN DESCRIPTION - DESCRIPTORS: DESCRIPTORS: ACHING

## 2024-06-27 ASSESSMENT — PAIN DESCRIPTION - LOCATION
LOCATION: SHOULDER
LOCATION: SHOULDER

## 2024-06-27 NOTE — ED TRIAGE NOTES
Pt is ambulatory to ER pt reports R shoulder pain, Pt was asleep last night and woke up with R shoulder pain, denies any falls

## 2024-06-27 NOTE — ED PROVIDER NOTES
Emergency Department Provider Note       PCP: Karol Conrad MD   Age: 63 y.o.   Sex: female     DISPOSITION       No diagnosis found.    Medical Decision Making     Interesting patient not quite certain as to initial presenting issue but possibly a inflammatory bursitis type component.  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  Ultrasound shows no evidence of DVT to area of swelling of the arm.  Exam of this region involving area of swelling showed no significant abnormalities as well per imaging report.  Will place patient on anti-inflammatories and short course of steroids.     1 acute complicated illness or injury.  Prescription drug management performed.    I independently ordered and reviewed each unique test.       I interpreted the Ultrasound  no sonographic pathology identified.  Patient lab work is essentially unremarkable with a normal sed rate normal white count.              History     Patient comes in with swelling to the right shoulder/AC region.  Now with also swelling to the right arm.  Onset of swelling was through the night.  No recall of trauma or injury.  No fevers or chills.  No history of similar.  Patient noted later in stay that she had some swelling to her arm as well.  Denies any chest pain or shortness of breath.  No history of DVT.  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    The history is provided by the patient.   Onset of swelling was during the night.  No provoking injury.  Works in a printing company    ROS     Review of Systems   Constitutional:  Negative for chills and fever.   Respiratory:  Negative for shortness of breath and wheezing.    Cardiovascular:  Negative for chest pain and leg swelling.   Skin:  Negative for color change, pallor and wound.   Neurological:  Negative for weakness and numbness.   Psychiatric/Behavioral: Negative.          Physical Exam     Vitals signs and nursing note reviewed:  Vitals:    06/27/24 1413   BP: (!)

## 2024-06-27 NOTE — DISCHARGE INSTRUCTIONS
At present lab work looks okay with no evidence of acute infection but also follow-up for any changes to the shoulder : any redness, increasing swelling or increasing pain or other changes  Anti-inflammatory: Naprosyn  Short course of Medrol (steroid)  No shoulder use for the next 3 to 4 days

## 2024-06-28 ASSESSMENT — ENCOUNTER SYMPTOMS
WHEEZING: 0
COLOR CHANGE: 0
SHORTNESS OF BREATH: 0

## 2024-06-28 NOTE — ED NOTES
Patient mobility status  with no difficulty. Provider aware     I have reviewed discharge instructions with the patient.  The patient verbalized understanding.    Patient left ED via Discharge Method: ambulatory to Home with Spouse.    Opportunity for questions and clarification provided.     Patient given 0 scripts.           Simran Swanson LPN  06/27/24 2000

## 2024-06-28 NOTE — DISCHARGE INSTR - COC
Continuity of Care Form    Patient Name: Bonny Vasquez   :  1960  MRN:  [id number]    Admit date:  2024  Discharge date:  ***    Code Status Order: No Order   Advance Directives:     Admitting Physician:  No admitting provider for patient encounter.  PCP: Karol Conrad MD    Discharging Nurse: ***  Discharging Hospital Unit/Room#: D02/D02  Discharging Unit Phone Number: ***    Emergency Contact:   Extended Emergency Contact Information  Primary Emergency Contact: Rk Vasquez  Address: [address] [place] [id number] [facility]  Home Phone: [phone]  Relation: Spouse    Past Surgical History:  Past Surgical History:   Procedure Laterality Date    HYSTERECTOMY (CERVIX STATUS UNKNOWN)      partial , complete     ORTHOPEDIC SURGERY      bilateral foot surgery       Immunization History:   Immunization History   Administered Date(s) Administered    COVID-19, PFIZER GRAY top, DO NOT Dilute, (age 12 y+), IM, 30 mcg/0.3 mL 2022    COVID-19, PFIZER PURPLE top, DILUTE for use, (age 12 y+), 30mcg/0.3mL 2021, 04/10/2021    Influenza Virus Vaccine 10/18/2017    TDaP, ADACEL (age 10y-64y), BOOSTRIX (age 10y+), IM, 0.5mL 2019       Active Problems:  Patient Active Problem List   Diagnosis Code    Class 1 obesity due to excess calories without serious comorbidity with body mass index (BMI) of 30.0 to 30.9 in adult E66.09, Z68.30    Acid reflux K21.9    High cholesterol E78.00    Migraine G43.909    Moderate episode of recurrent major depressive disorder (HCC) F33.1       Isolation/Infection:   Isolation            No Isolation          Patient Infection Status       None to display                     Nurse Assessment:  Last Vital Signs: BP (!) 155/88   Pulse 70   Temp 98.6 °F (37 °C) (Oral)   Resp 18   Ht 1.626 m (5' 4\")   Wt 81.6 kg (180 lb)   SpO2 99%   BMI 30.90 kg/m²     Last documented pain score (0-10 scale): Pain Level: 5  Last

## 2024-07-15 ENCOUNTER — OFFICE VISIT (OUTPATIENT)
Dept: FAMILY MEDICINE CLINIC | Facility: CLINIC | Age: 64
End: 2024-07-15
Payer: COMMERCIAL

## 2024-07-15 VITALS
HEIGHT: 64 IN | RESPIRATION RATE: 16 BRPM | BODY MASS INDEX: 32.2 KG/M2 | OXYGEN SATURATION: 99 % | SYSTOLIC BLOOD PRESSURE: 139 MMHG | TEMPERATURE: 97.5 F | DIASTOLIC BLOOD PRESSURE: 84 MMHG | WEIGHT: 188.6 LBS | HEART RATE: 77 BPM

## 2024-07-15 DIAGNOSIS — K21.9 GASTROESOPHAGEAL REFLUX DISEASE WITHOUT ESOPHAGITIS: ICD-10-CM

## 2024-07-15 DIAGNOSIS — F33.1 MODERATE EPISODE OF RECURRENT MAJOR DEPRESSIVE DISORDER (HCC): ICD-10-CM

## 2024-07-15 DIAGNOSIS — M25.511 ACUTE PAIN OF RIGHT SHOULDER: Primary | ICD-10-CM

## 2024-07-15 PROCEDURE — 99214 OFFICE O/P EST MOD 30 MIN: CPT | Performed by: PHYSICIAN ASSISTANT

## 2024-07-15 RX ORDER — OMEPRAZOLE 40 MG/1
CAPSULE, DELAYED RELEASE ORAL
Qty: 90 CAPSULE | Refills: 1 | Status: SHIPPED | OUTPATIENT
Start: 2024-07-15

## 2024-07-15 RX ORDER — FLUOXETINE HYDROCHLORIDE 20 MG/1
20 CAPSULE ORAL DAILY
Qty: 90 CAPSULE | Refills: 1 | Status: SHIPPED | OUTPATIENT
Start: 2024-07-15

## 2024-07-15 RX ORDER — ESCITALOPRAM OXALATE 20 MG/1
20 TABLET ORAL DAILY
Qty: 90 TABLET | Refills: 3 | Status: CANCELLED | OUTPATIENT
Start: 2024-07-15

## 2024-07-15 SDOH — ECONOMIC STABILITY: FOOD INSECURITY: WITHIN THE PAST 12 MONTHS, YOU WORRIED THAT YOUR FOOD WOULD RUN OUT BEFORE YOU GOT MONEY TO BUY MORE.: NEVER TRUE

## 2024-07-15 SDOH — ECONOMIC STABILITY: FOOD INSECURITY: WITHIN THE PAST 12 MONTHS, THE FOOD YOU BOUGHT JUST DIDN'T LAST AND YOU DIDN'T HAVE MONEY TO GET MORE.: NEVER TRUE

## 2024-07-15 SDOH — ECONOMIC STABILITY: INCOME INSECURITY: HOW HARD IS IT FOR YOU TO PAY FOR THE VERY BASICS LIKE FOOD, HOUSING, MEDICAL CARE, AND HEATING?: NOT HARD AT ALL

## 2024-07-15 ASSESSMENT — PATIENT HEALTH QUESTIONNAIRE - PHQ9
1. LITTLE INTEREST OR PLEASURE IN DOING THINGS: NOT AT ALL
SUM OF ALL RESPONSES TO PHQ9 QUESTIONS 1 & 2: 0
2. FEELING DOWN, DEPRESSED OR HOPELESS: NOT AT ALL
8. MOVING OR SPEAKING SO SLOWLY THAT OTHER PEOPLE COULD HAVE NOTICED. OR THE OPPOSITE, BEING SO FIGETY OR RESTLESS THAT YOU HAVE BEEN MOVING AROUND A LOT MORE THAN USUAL: NOT AT ALL
7. TROUBLE CONCENTRATING ON THINGS, SUCH AS READING THE NEWSPAPER OR WATCHING TELEVISION: NOT AT ALL
9. THOUGHTS THAT YOU WOULD BE BETTER OFF DEAD, OR OF HURTING YOURSELF: NOT AT ALL
SUM OF ALL RESPONSES TO PHQ QUESTIONS 1-9: 0
4. FEELING TIRED OR HAVING LITTLE ENERGY: NOT AT ALL
SUM OF ALL RESPONSES TO PHQ QUESTIONS 1-9: 0
10. IF YOU CHECKED OFF ANY PROBLEMS, HOW DIFFICULT HAVE THESE PROBLEMS MADE IT FOR YOU TO DO YOUR WORK, TAKE CARE OF THINGS AT HOME, OR GET ALONG WITH OTHER PEOPLE: NOT DIFFICULT AT ALL
3. TROUBLE FALLING OR STAYING ASLEEP: NOT AT ALL
5. POOR APPETITE OR OVEREATING: NOT AT ALL
SUM OF ALL RESPONSES TO PHQ QUESTIONS 1-9: 0
SUM OF ALL RESPONSES TO PHQ QUESTIONS 1-9: 0
6. FEELING BAD ABOUT YOURSELF - OR THAT YOU ARE A FAILURE OR HAVE LET YOURSELF OR YOUR FAMILY DOWN: NOT AT ALL

## 2024-07-15 ASSESSMENT — ENCOUNTER SYMPTOMS
BACK PAIN: 0
COLOR CHANGE: 0
SHORTNESS OF BREATH: 0

## 2024-07-15 NOTE — PATIENT INSTRUCTIONS
*A referral has been placed to Orthopedics. They should contact you in the next 7-10 days to schedule. Please let us know if you do not hear from them.

## 2024-07-15 NOTE — PROGRESS NOTES
[facility] [address] [place] [address]  Phone [phone]      Patient: Bonny Vasquez  YOB: 1960  Age 63 y.o.  Sex female  Medical Record:  [id number]  Visit Date: 07/15/24  Author:  Ludwig Sow PA-C  This is   [facility] Clinic Note    Chief Complaint   Patient presents with    Arm Pain     Right arm    Other     Handicap placard renewal        History of Present Illness  This is a 63-year-old female who presents today with complaints of persistent right shoulder pain.  This has been ongoing now for about a month.  She denies any preceding injury or trauma.  Notes that her job requires her to use her arms and hands extensively throughout her shift.  If she has her elbows at her side she does not seem to have much trouble however if she tries to raise her arms the pain intensifies.  About 3 weeks ago she had significant swelling the anterior shoulder and went to the ER for evaluation.  X-ray of the shoulder showed mild to moderate degenerative changes.  An ultrasound was also done given some swelling of her right arm at that time.  No DVT was noted.  Patient was given a prescription for naproxen to take twice daily.  She states that she was not very familiar with the medication so she just use over-the-counter ibuprofen which does provide some mild, temporary improvement of her discomfort.  Range of motion continues to be limited.  She denies numbness or tingling in the extremity.  Denies neck pain.  Patient also notes that she requested a renewal of her handicap placard sometime in the last couple months.  She states that she has not yet picked it up and is hopeful that it was still available.    Patient has a history of depression.  Currently taking fluoxetine 20 mg daily.  She states that this is working well for her and she denies any problems or side effects.  Needing a refill on the medication.  She is also needing a refill on omeprazole which she

## 2024-08-21 ENCOUNTER — OFFICE VISIT (OUTPATIENT)
Dept: ORTHOPEDIC SURGERY | Age: 64
End: 2024-08-21
Payer: COMMERCIAL

## 2024-08-21 DIAGNOSIS — M19.011 ARTHRITIS OF RIGHT ACROMIOCLAVICULAR JOINT: ICD-10-CM

## 2024-08-21 DIAGNOSIS — M25.511 RIGHT SHOULDER PAIN, UNSPECIFIED CHRONICITY: Primary | ICD-10-CM

## 2024-08-21 DIAGNOSIS — M67.921 TENDINOPATHY OF RIGHT BICEPS TENDON: ICD-10-CM

## 2024-08-21 PROCEDURE — 99204 OFFICE O/P NEW MOD 45 MIN: CPT | Performed by: ORTHOPAEDIC SURGERY

## 2024-08-21 NOTE — PROGRESS NOTES
Pysch: Answers questions appropriately, AO X 3.  MSK:    Cervical spine: No tenderness. Normal active motion. Negative Spurling's maneuver.     SHOULDER   Right (Involved) left   Skin Intact Intact   Radial Pulses 2+ Symmetrical 2+ Symmetrical   Deformity None Normal   Myotomes Normal Normal   Dermatomes  Normal Normal    ROM Mildly guarded secondary to pain at the AC joint and anteriorly. Full   Strength Strength is good with internal ex rotation.  Scapular plane testing is mildly guarded secondary to pain and the other above-mentioned sites. No weakness   Atrophy None noted None noted   Effusion/Swelling  None noted None noted   Palpation/Tenderness AC joint and biceps No tenderness   Bicep Tendon Rupture  Negative, positive speeds and Quitman's Negative signs   Bear Hug, Belly Press Negative, negative Negative   Crossed Arm Adduction Test     Instability/Ant. Apprehension Test None noted None noted   Impingement Positive  hardik Ortiz AOM Negative      X-rays:   Grashey, axillary and outlet views of the Right shoulder that were obtained 6-27-24 and reviewed today in the office, show a type II acromion.  The humeral head is not high riding .   No evidence of fracture, arthritis, dislocation or loose body.  Severe degenerative changes of the A-C joint.     Impression: Right shoulder appears normal with AC severe arthritis    Assessment:     ICD-10-CM    1. Right shoulder pain, unspecified chronicity  M25.511       2. Arthritis of right acromioclavicular joint  M19.011       3. Tendinopathy of right biceps tendon  M67.921            Plan: I had a long discussion with the patient regarding the natural history of the problem, as well as treatment options. I discussed with the patient treatment options including oral medication, injections, advanced imaging, physical therapy and ultimately surgical intervention.  At this time, the patient has decided with me to proceed with injections of both before meals and

## 2024-09-05 ENCOUNTER — OFFICE VISIT (OUTPATIENT)
Dept: FAMILY MEDICINE CLINIC | Facility: CLINIC | Age: 64
End: 2024-09-05
Payer: COMMERCIAL

## 2024-09-05 VITALS
OXYGEN SATURATION: 99 % | HEART RATE: 80 BPM | RESPIRATION RATE: 16 BRPM | BODY MASS INDEX: 31.58 KG/M2 | WEIGHT: 185 LBS | HEIGHT: 64 IN | DIASTOLIC BLOOD PRESSURE: 81 MMHG | TEMPERATURE: 97.7 F | SYSTOLIC BLOOD PRESSURE: 149 MMHG

## 2024-09-05 DIAGNOSIS — G44.52 NEW DAILY PERSISTENT HEADACHE: ICD-10-CM

## 2024-09-05 DIAGNOSIS — E66.09 CLASS 1 OBESITY DUE TO EXCESS CALORIES WITHOUT SERIOUS COMORBIDITY WITH BODY MASS INDEX (BMI) OF 31.0 TO 31.9 IN ADULT: ICD-10-CM

## 2024-09-05 DIAGNOSIS — I10 HYPERTENSION, UNSPECIFIED TYPE: Primary | ICD-10-CM

## 2024-09-05 PROCEDURE — 3079F DIAST BP 80-89 MM HG: CPT

## 2024-09-05 PROCEDURE — 3077F SYST BP >= 140 MM HG: CPT

## 2024-09-05 PROCEDURE — 99214 OFFICE O/P EST MOD 30 MIN: CPT

## 2024-09-05 RX ORDER — HYDROCHLOROTHIAZIDE 25 MG/1
25 TABLET ORAL EVERY MORNING
Qty: 90 TABLET | Refills: 1 | Status: SHIPPED | OUTPATIENT
Start: 2024-09-05

## 2024-09-05 ASSESSMENT — PATIENT HEALTH QUESTIONNAIRE - PHQ9
SUM OF ALL RESPONSES TO PHQ QUESTIONS 1-9: 0
SUM OF ALL RESPONSES TO PHQ QUESTIONS 1-9: 0
7. TROUBLE CONCENTRATING ON THINGS, SUCH AS READING THE NEWSPAPER OR WATCHING TELEVISION: NOT AT ALL
SUM OF ALL RESPONSES TO PHQ9 QUESTIONS 1 & 2: 0
4. FEELING TIRED OR HAVING LITTLE ENERGY: NOT AT ALL
SUM OF ALL RESPONSES TO PHQ QUESTIONS 1-9: 0
6. FEELING BAD ABOUT YOURSELF - OR THAT YOU ARE A FAILURE OR HAVE LET YOURSELF OR YOUR FAMILY DOWN: NOT AT ALL
3. TROUBLE FALLING OR STAYING ASLEEP: NOT AT ALL
1. LITTLE INTEREST OR PLEASURE IN DOING THINGS: NOT AT ALL
8. MOVING OR SPEAKING SO SLOWLY THAT OTHER PEOPLE COULD HAVE NOTICED. OR THE OPPOSITE, BEING SO FIGETY OR RESTLESS THAT YOU HAVE BEEN MOVING AROUND A LOT MORE THAN USUAL: NOT AT ALL
SUM OF ALL RESPONSES TO PHQ QUESTIONS 1-9: 0
5. POOR APPETITE OR OVEREATING: NOT AT ALL

## 2024-09-09 ENCOUNTER — TELEPHONE (OUTPATIENT)
Dept: FAMILY MEDICINE CLINIC | Facility: CLINIC | Age: 64
End: 2024-09-09

## 2024-09-09 NOTE — TELEPHONE ENCOUNTER
The BP meds that she is on she says it is to strong for her she has bee dizzy head hurts want to maybe cut it in half please call her

## 2024-09-09 NOTE — TELEPHONE ENCOUNTER
Spoke with pt and she states that when she takes the HCTZ she is having dizziness and hard to function. Her BP is still high. Pt states that's she stopped the medication yesterday.     Please advise.

## 2024-09-10 NOTE — TELEPHONE ENCOUNTER
Pt states that she did try cutting in half for 2 days and she kept a bad headache. Pt is wanting to know if there is another medication she can try. Please advise.

## 2024-09-16 ENCOUNTER — TELEMEDICINE (OUTPATIENT)
Dept: FAMILY MEDICINE CLINIC | Facility: CLINIC | Age: 64
End: 2024-09-16
Payer: COMMERCIAL

## 2024-09-16 DIAGNOSIS — E66.09 CLASS 1 OBESITY DUE TO EXCESS CALORIES WITHOUT SERIOUS COMORBIDITY WITH BODY MASS INDEX (BMI) OF 31.0 TO 31.9 IN ADULT: ICD-10-CM

## 2024-09-16 DIAGNOSIS — G44.52 NEW DAILY PERSISTENT HEADACHE: ICD-10-CM

## 2024-09-16 DIAGNOSIS — I10 HYPERTENSION, UNSPECIFIED TYPE: Primary | ICD-10-CM

## 2024-09-16 DIAGNOSIS — E66.09 CLASS 1 OBESITY DUE TO EXCESS CALORIES WITHOUT SERIOUS COMORBIDITY WITH BODY MASS INDEX (BMI) OF 30.0 TO 30.9 IN ADULT: ICD-10-CM

## 2024-09-16 PROBLEM — E66.811 CLASS 1 OBESITY DUE TO EXCESS CALORIES WITHOUT SERIOUS COMORBIDITY WITH BODY MASS INDEX (BMI) OF 31.0 TO 31.9 IN ADULT: Status: ACTIVE | Noted: 2024-09-16

## 2024-09-16 PROBLEM — E66.811 CLASS 1 OBESITY DUE TO EXCESS CALORIES WITHOUT SERIOUS COMORBIDITY WITH BODY MASS INDEX (BMI) OF 30.0 TO 30.9 IN ADULT: Status: RESOLVED | Noted: 2019-04-03 | Resolved: 2024-09-16

## 2024-09-16 PROCEDURE — 99442 PR PHYS/QHP TELEPHONE EVALUATION 11-20 MIN: CPT

## 2024-09-16 RX ORDER — LOSARTAN POTASSIUM 25 MG/1
25 TABLET ORAL DAILY
Qty: 90 TABLET | Refills: 1 | Status: SHIPPED | OUTPATIENT
Start: 2024-09-16

## 2024-09-16 RX ORDER — AMLODIPINE BESYLATE 5 MG/1
5 TABLET ORAL DAILY
Qty: 30 TABLET | Refills: 0 | Status: SHIPPED | OUTPATIENT
Start: 2024-09-16

## 2024-09-16 ASSESSMENT — ENCOUNTER SYMPTOMS
EYE REDNESS: 0
DIARRHEA: 0
COUGH: 0
CHEST TIGHTNESS: 0
BLOOD IN STOOL: 0
SINUS PRESSURE: 0
EYE DISCHARGE: 0
SHORTNESS OF BREATH: 0
NAUSEA: 0
EYE PAIN: 0
ABDOMINAL PAIN: 0
SINUS PAIN: 0
VOMITING: 0

## 2024-09-16 ASSESSMENT — PATIENT HEALTH QUESTIONNAIRE - PHQ9
SUM OF ALL RESPONSES TO PHQ9 QUESTIONS 1 & 2: 0
SUM OF ALL RESPONSES TO PHQ QUESTIONS 1-9: 0
1. LITTLE INTEREST OR PLEASURE IN DOING THINGS: NOT AT ALL
2. FEELING DOWN, DEPRESSED OR HOPELESS: NOT AT ALL
SUM OF ALL RESPONSES TO PHQ QUESTIONS 1-9: 0

## 2024-10-30 ENCOUNTER — OFFICE VISIT (OUTPATIENT)
Dept: FAMILY MEDICINE CLINIC | Facility: CLINIC | Age: 64
End: 2024-10-30
Payer: COMMERCIAL

## 2024-10-30 VITALS
SYSTOLIC BLOOD PRESSURE: 142 MMHG | TEMPERATURE: 97.7 F | HEART RATE: 71 BPM | OXYGEN SATURATION: 99 % | WEIGHT: 185 LBS | DIASTOLIC BLOOD PRESSURE: 75 MMHG | RESPIRATION RATE: 16 BRPM | BODY MASS INDEX: 31.58 KG/M2 | HEIGHT: 64 IN

## 2024-10-30 DIAGNOSIS — F33.1 MODERATE EPISODE OF RECURRENT MAJOR DEPRESSIVE DISORDER (HCC): ICD-10-CM

## 2024-10-30 DIAGNOSIS — R11.0 NAUSEA: ICD-10-CM

## 2024-10-30 DIAGNOSIS — E78.00 HIGH CHOLESTEROL: ICD-10-CM

## 2024-10-30 DIAGNOSIS — I10 HYPERTENSION, UNSPECIFIED TYPE: Primary | ICD-10-CM

## 2024-10-30 DIAGNOSIS — K21.9 GASTROESOPHAGEAL REFLUX DISEASE WITHOUT ESOPHAGITIS: ICD-10-CM

## 2024-10-30 PROCEDURE — 3077F SYST BP >= 140 MM HG: CPT | Performed by: FAMILY MEDICINE

## 2024-10-30 PROCEDURE — 3078F DIAST BP <80 MM HG: CPT | Performed by: FAMILY MEDICINE

## 2024-10-30 PROCEDURE — 99214 OFFICE O/P EST MOD 30 MIN: CPT | Performed by: FAMILY MEDICINE

## 2024-10-30 RX ORDER — OMEPRAZOLE 40 MG/1
CAPSULE, DELAYED RELEASE ORAL
Qty: 90 CAPSULE | Refills: 3 | Status: SHIPPED | OUTPATIENT
Start: 2024-10-30

## 2024-10-30 RX ORDER — AMLODIPINE BESYLATE 5 MG/1
5 TABLET ORAL DAILY
Qty: 90 TABLET | Refills: 3 | Status: SHIPPED | OUTPATIENT
Start: 2024-10-30

## 2024-10-30 RX ORDER — PROMETHAZINE HYDROCHLORIDE 25 MG/1
25 TABLET ORAL EVERY 4 HOURS PRN
Qty: 20 TABLET | Refills: 5 | Status: SHIPPED | OUTPATIENT
Start: 2024-10-30

## 2024-10-30 RX ORDER — LOSARTAN POTASSIUM 50 MG/1
50 TABLET ORAL DAILY
Qty: 90 TABLET | Refills: 3 | Status: SHIPPED | OUTPATIENT
Start: 2024-10-30

## 2024-10-30 NOTE — PROGRESS NOTES
Year: Not on file     Unstable Housing in the Last Year: No       Allergies   Allergen Reactions    Codeine Swelling     throat    Sumatriptan Anaphylaxis    Crestor [Rosuvastatin] Other (See Comments)     arthralgias    Famotidine Nausea Only    Hydrocodone-Acetaminophen Nausea And Vomiting       BP (!) 142/75   Pulse 71   Temp 97.7 °F (36.5 °C)   Resp 16   Ht 1.626 m (5' 4\")   Wt 83.9 kg (185 lb)   SpO2 99%   BMI 31.76 kg/m²     HEENT: Normocephalic, atraumatic, pupils equal and reactive to light.   Neck: Supple, no masses or thyromegaly.  Lungs: clear to auscultation bilaterally.  CV: regular rate and rhythm, without murmurs, rubs, or gallops  Ext: No lower extremity edema.    Bonny was seen today for hypertension.    Diagnoses and all orders for this visit:    Hypertension, unspecified type  -     losartan (COZAAR) 50 MG tablet; Take 1 tablet by mouth daily  -     amLODIPine (NORVASC) 5 MG tablet; Take 1 tablet by mouth daily    Nausea  -     promethazine (PHENERGAN) 25 MG tablet; Take 1 tablet by mouth every 4 hours as needed for Nausea    Moderate episode of recurrent major depressive disorder (HCC)  -     FLUoxetine (PROZAC) 20 MG capsule; Take 1 capsule by mouth daily    Gastroesophageal reflux disease without esophagitis  -     omeprazole (PRILOSEC) 40 MG delayed release capsule; TAKE ONE CAPSULE BY MOUTH EVERY DAY    High cholesterol  -     Lipid Panel; Future      Hypertension not optimally controlled.  Continue amlodipine and increase losartan to 50 mg daily.  Record her blood pressure regularly and follow-up in 3 months for recheck.    Nausea controlled with Phenergan.  Continue as needed.    Moderate recurrent depression, stable on Prozac.  Continue this.    Reflux stable on Prilosec.  Continue this.    High cholesterol.  Repeat lipid panel before she returns in 3 months.  She was not able to tolerate Crestor but we could try another medicine.  Karol Conrad MD

## 2024-11-06 DIAGNOSIS — E78.00 HIGH CHOLESTEROL: ICD-10-CM

## 2024-11-06 LAB
CHOLEST SERPL-MCNC: 288 MG/DL (ref 0–200)
HDLC SERPL-MCNC: 60 MG/DL (ref 40–60)
HDLC SERPL: 4.8 (ref 0–5)
LDLC SERPL CALC-MCNC: 210 MG/DL (ref 0–100)
TRIGL SERPL-MCNC: 87 MG/DL (ref 0–150)
VLDLC SERPL CALC-MCNC: 17 MG/DL (ref 6–23)

## 2024-11-06 NOTE — RESULT ENCOUNTER NOTE
Let patient know labs normal except cholesterol is really high.  Would she be willing to do a cholesterol medicine?

## 2024-11-07 ENCOUNTER — TELEPHONE (OUTPATIENT)
Dept: FAMILY MEDICINE CLINIC | Facility: CLINIC | Age: 64
End: 2024-11-07

## 2024-11-07 DIAGNOSIS — E78.00 HIGH CHOLESTEROL: Primary | ICD-10-CM

## 2024-11-07 NOTE — TELEPHONE ENCOUNTER
----- Message from Dr. Karol Conrad MD sent at 11/6/2024  4:44 PM EST -----  Let patient know labs normal except cholesterol is really high.  Would she be willing to do a cholesterol medicine?

## 2024-11-07 NOTE — TELEPHONE ENCOUNTER
Pt is agreeable. Pt states that she has taken Crestor in the past and did not do well. Pt states that she is willing to try another medication.

## 2024-11-10 RX ORDER — ATORVASTATIN CALCIUM 20 MG/1
20 TABLET, FILM COATED ORAL DAILY
Qty: 90 TABLET | Refills: 3 | Status: SHIPPED | OUTPATIENT
Start: 2024-11-10

## 2024-11-11 NOTE — TELEPHONE ENCOUNTER
Will have her try atorvastatin.  Please have her follow-up in 3 months for lipid panel, lab only visit.

## 2025-03-04 ENCOUNTER — APPOINTMENT (OUTPATIENT)
Dept: GENERAL RADIOLOGY | Age: 65
End: 2025-03-04

## 2025-03-04 ENCOUNTER — HOSPITAL ENCOUNTER (EMERGENCY)
Age: 65
Discharge: HOME | End: 2025-03-04
Attending: EMERGENCY MEDICINE

## 2025-03-04 ENCOUNTER — TELEPHONE (OUTPATIENT)
Dept: FAMILY MEDICINE CLINIC | Facility: CLINIC | Age: 65
End: 2025-03-04

## 2025-03-04 VITALS
BODY MASS INDEX: 30.73 KG/M2 | TEMPERATURE: 98.8 F | WEIGHT: 180 LBS | SYSTOLIC BLOOD PRESSURE: 131 MMHG | HEIGHT: 64 IN | DIASTOLIC BLOOD PRESSURE: 66 MMHG | RESPIRATION RATE: 16 BRPM | OXYGEN SATURATION: 100 % | HEART RATE: 72 BPM

## 2025-03-04 DIAGNOSIS — R91.8 INFILTRATE OF LUNG PRESENT ON CHEST X-RAY: ICD-10-CM

## 2025-03-04 DIAGNOSIS — R07.9 CHEST PAIN, UNSPECIFIED TYPE: Primary | ICD-10-CM

## 2025-03-04 LAB
ALBUMIN SERPL-MCNC: 3.9 G/DL (ref 3.2–4.6)
ALBUMIN/GLOB SERPL: 1.4 (ref 1–1.9)
ALP SERPL-CCNC: 110 U/L (ref 35–104)
ALT SERPL-CCNC: 24 U/L (ref 8–45)
ANION GAP SERPL CALC-SCNC: 12 MMOL/L (ref 7–16)
AST SERPL-CCNC: 20 U/L (ref 15–37)
BASOPHILS # BLD: 0.02 K/UL (ref 0–0.2)
BASOPHILS NFR BLD: 0.3 % (ref 0–2)
BILIRUB SERPL-MCNC: 0.3 MG/DL (ref 0–1.2)
BUN SERPL-MCNC: 14 MG/DL (ref 8–23)
CALCIUM SERPL-MCNC: 9.1 MG/DL (ref 8.8–10.2)
CHLORIDE SERPL-SCNC: 106 MMOL/L (ref 98–107)
CO2 SERPL-SCNC: 22 MMOL/L (ref 20–29)
CREAT SERPL-MCNC: 0.85 MG/DL (ref 0.6–1.1)
DIFFERENTIAL METHOD BLD: ABNORMAL
EKG ATRIAL RATE: 77 BPM
EKG DIAGNOSIS: NORMAL
EKG P AXIS: 71 DEGREES
EKG P-R INTERVAL: 183 MS
EKG Q-T INTERVAL: 411 MS
EKG QRS DURATION: 91 MS
EKG QTC CALCULATION (BAZETT): 466 MS
EKG R AXIS: 4 DEGREES
EKG T AXIS: 54 DEGREES
EKG VENTRICULAR RATE: 77 BPM
EOSINOPHIL # BLD: 0.09 K/UL (ref 0–0.8)
EOSINOPHIL NFR BLD: 1.6 % (ref 0.5–7.8)
ERYTHROCYTE [DISTWIDTH] IN BLOOD BY AUTOMATED COUNT: 13.9 % (ref 11.9–14.6)
GLOBULIN SER CALC-MCNC: 2.8 G/DL (ref 2.3–3.5)
GLUCOSE SERPL-MCNC: 80 MG/DL (ref 70–99)
HCT VFR BLD AUTO: 36.3 % (ref 35.8–46.3)
HGB BLD-MCNC: 12.1 G/DL (ref 11.7–15.4)
IMM GRANULOCYTES # BLD AUTO: 0.01 K/UL (ref 0–0.5)
IMM GRANULOCYTES NFR BLD AUTO: 0.2 % (ref 0–5)
LYMPHOCYTES # BLD: 2.2 K/UL (ref 0.5–4.6)
LYMPHOCYTES NFR BLD: 37.9 % (ref 13–44)
MCH RBC QN AUTO: 24.5 PG (ref 26.1–32.9)
MCHC RBC AUTO-ENTMCNC: 33.3 G/DL (ref 31.4–35)
MCV RBC AUTO: 73.5 FL (ref 82–102)
MONOCYTES # BLD: 0.42 K/UL (ref 0.1–1.3)
MONOCYTES NFR BLD: 7.2 % (ref 4–12)
NEUTS SEG # BLD: 3.06 K/UL (ref 1.7–8.2)
NEUTS SEG NFR BLD: 52.8 % (ref 43–78)
NRBC # BLD: 0 K/UL (ref 0–0.2)
PLATELET # BLD AUTO: 272 K/UL (ref 150–450)
PMV BLD AUTO: 9.8 FL (ref 9.4–12.3)
POTASSIUM SERPL-SCNC: 4.1 MMOL/L (ref 3.5–5.1)
PROT SERPL-MCNC: 6.7 G/DL (ref 6.3–8.2)
RBC # BLD AUTO: 4.94 M/UL (ref 4.05–5.2)
SODIUM SERPL-SCNC: 140 MMOL/L (ref 136–145)
TROPONIN T SERPL HS-MCNC: <6 NG/L (ref 0–14)
TROPONIN T SERPL HS-MCNC: <6 NG/L (ref 0–14)
WBC # BLD AUTO: 5.8 K/UL (ref 4.3–11.1)

## 2025-03-04 PROCEDURE — 85025 COMPLETE CBC W/AUTO DIFF WBC: CPT

## 2025-03-04 PROCEDURE — 96374 THER/PROPH/DIAG INJ IV PUSH: CPT

## 2025-03-04 PROCEDURE — 80053 COMPREHEN METABOLIC PANEL: CPT

## 2025-03-04 PROCEDURE — 99285 EMERGENCY DEPT VISIT HI MDM: CPT

## 2025-03-04 PROCEDURE — 71046 X-RAY EXAM CHEST 2 VIEWS: CPT

## 2025-03-04 PROCEDURE — 93005 ELECTROCARDIOGRAM TRACING: CPT | Performed by: EMERGENCY MEDICINE

## 2025-03-04 PROCEDURE — 6360000002 HC RX W HCPCS: Performed by: EMERGENCY MEDICINE

## 2025-03-04 PROCEDURE — 84484 ASSAY OF TROPONIN QUANT: CPT

## 2025-03-04 PROCEDURE — 93010 ELECTROCARDIOGRAM REPORT: CPT | Performed by: INTERNAL MEDICINE

## 2025-03-04 PROCEDURE — 6370000000 HC RX 637 (ALT 250 FOR IP): Performed by: EMERGENCY MEDICINE

## 2025-03-04 RX ORDER — LEVOFLOXACIN 500 MG/1
500 TABLET, FILM COATED ORAL DAILY
Qty: 5 TABLET | Refills: 0 | Status: SHIPPED | OUTPATIENT
Start: 2025-03-04 | End: 2025-03-09

## 2025-03-04 RX ORDER — KETOROLAC TROMETHAMINE 15 MG/ML
15 INJECTION, SOLUTION INTRAMUSCULAR; INTRAVENOUS
Status: COMPLETED | OUTPATIENT
Start: 2025-03-04 | End: 2025-03-04

## 2025-03-04 RX ORDER — NITROGLYCERIN 0.4 MG/1
0.4 TABLET SUBLINGUAL EVERY 5 MIN PRN
Qty: 25 TABLET | Refills: 3 | Status: SHIPPED | OUTPATIENT
Start: 2025-03-04

## 2025-03-04 RX ORDER — ASPIRIN 81 MG/1
324 TABLET, CHEWABLE ORAL
Status: COMPLETED | OUTPATIENT
Start: 2025-03-04 | End: 2025-03-04

## 2025-03-04 RX ADMIN — KETOROLAC TROMETHAMINE 15 MG: 15 INJECTION, SOLUTION INTRAMUSCULAR; INTRAVENOUS at 12:44

## 2025-03-04 RX ADMIN — ASPIRIN 324 MG: 81 TABLET, CHEWABLE ORAL at 10:32

## 2025-03-04 RX ADMIN — NITROGLYCERIN 1 INCH: 20 OINTMENT TOPICAL at 10:32

## 2025-03-04 ASSESSMENT — PAIN DESCRIPTION - LOCATION: LOCATION: CHEST

## 2025-03-04 ASSESSMENT — ENCOUNTER SYMPTOMS
SHORTNESS OF BREATH: 0
COUGH: 0

## 2025-03-04 ASSESSMENT — LIFESTYLE VARIABLES
HOW OFTEN DO YOU HAVE A DRINK CONTAINING ALCOHOL: NEVER
HOW MANY STANDARD DRINKS CONTAINING ALCOHOL DO YOU HAVE ON A TYPICAL DAY: PATIENT DOES NOT DRINK

## 2025-03-04 ASSESSMENT — PAIN SCALES - GENERAL
PAINLEVEL_OUTOF10: 5
PAINLEVEL_OUTOF10: 0

## 2025-03-04 ASSESSMENT — PAIN - FUNCTIONAL ASSESSMENT: PAIN_FUNCTIONAL_ASSESSMENT: 0-10

## 2025-03-04 NOTE — ED TRIAGE NOTES
Patient to triage with c/o chest tightness, headaches, dizziness and left hand tingling that started 2-3 days ago.

## 2025-03-04 NOTE — ED PROVIDER NOTES
Emergency Department Provider Note       PCP: Karol Conrad MD   Age: 64 y.o.   Sex: female     DISPOSITION Decision To Discharge 03/04/2025 01:51:49 PM    ICD-10-CM    1. Chest pain, unspecified type  R07.9 [facility]      2. Infiltrate of lung present on chest x-ray  R91.8           Medical Decision Making     DDX:     CHF, COPD, pneumonia, PE,    MI, coronary artery disease, unstable angina, coronary artery disease,    Atrial fibrillation, cardiac arrhythmia, PVC, medication induced palpitations, heart block,  electrolyte induced palpitations,    Aortic dissection, aortic aneurysm,    GERD, musculoskeletal pain, costochondritis, rib fracture, pleurisy,    ED Course as of 03/04/25 1353   Tue Mar 04, 2025   1247 I updated the patient regarding the findings here in the emergency department.  While her chest pain is reproducible she did have pain relief with nitroglycerin paste here in the ER.  I am concerned that she needs to have outpatient cardiac workup sooner than later and referral for urgent follow-up will be placed.  Patient will be given a prescription for nitroglycerin to be taken at home and we discussed the use of this medicine and the risk and benefits.  Patient does have infiltrate noted on her chest x-ray but no signs of sepsis.  She will be given 5 days of antibiotics Levaquin for this.  We talked about return criteria and she feels comfortable with this plan [KH]      ED Course User Index  [KH] Lan Aguirre, DO     1 acute illness with systemic symptoms.  Prescription drug management performed.  Shared medical decision making was utilized in creating the patients health plan today.  I independently ordered and reviewed each unique test.    I reviewed external records: ED visit note from a different ED.   I reviewed external records: provider visit note from PCP.  I reviewed external records: previous EKG including cardiologist interpretation.    I reviewed external

## 2025-03-04 NOTE — DISCHARGE INSTRUCTIONS
Call the cardiology office to schedule your outpatient follow-up appointment.  Take the full course of antibiotics as prescribed.  Try doing some doorway stretching exercises to see if this helps with some of the pain you have experienced intermittently over the last few days.  If your symptoms start to change or you begin developing any new or concerning symptoms please just return immediately to the emergency department for further evaluation.

## 2025-03-04 NOTE — TELEPHONE ENCOUNTER
Pt was a nurse triage - having chest pain/tightness and arm pain/numbness - informed pt she needs to go to the er because it could potentially be a heart attack and she needs to get seen. Pt said she didn't want to go to the er but I told her that she needs to go and get checked out. That with her having the chest pain going into her arm it is emergent for her to go. Pt stated she will go

## 2025-03-11 ENCOUNTER — PREP FOR PROCEDURE (OUTPATIENT)
Dept: CARDIOLOGY CLINIC | Age: 65
End: 2025-03-11

## 2025-03-11 ENCOUNTER — INITIAL CONSULT (OUTPATIENT)
Age: 65
End: 2025-03-11
Payer: OTHER GOVERNMENT

## 2025-03-11 VITALS
DIASTOLIC BLOOD PRESSURE: 80 MMHG | SYSTOLIC BLOOD PRESSURE: 120 MMHG | WEIGHT: 185.4 LBS | BODY MASS INDEX: 31.65 KG/M2 | HEIGHT: 64 IN | HEART RATE: 80 BPM

## 2025-03-11 DIAGNOSIS — R07.2 PRECORDIAL CHEST PAIN: Primary | ICD-10-CM

## 2025-03-11 DIAGNOSIS — R07.2 PRECORDIAL CHEST PAIN: ICD-10-CM

## 2025-03-11 DIAGNOSIS — I20.0 UNSTABLE ANGINA (HCC): Primary | ICD-10-CM

## 2025-03-11 LAB
ANION GAP SERPL CALC-SCNC: 14 MMOL/L (ref 7–16)
BUN SERPL-MCNC: 12 MG/DL (ref 8–23)
CALCIUM SERPL-MCNC: 9.6 MG/DL (ref 8.8–10.2)
CHLORIDE SERPL-SCNC: 105 MMOL/L (ref 98–107)
CO2 SERPL-SCNC: 21 MMOL/L (ref 20–29)
CREAT SERPL-MCNC: 0.79 MG/DL (ref 0.6–1.1)
ERYTHROCYTE [DISTWIDTH] IN BLOOD BY AUTOMATED COUNT: 13.9 % (ref 11.9–14.6)
GLUCOSE SERPL-MCNC: 69 MG/DL (ref 70–99)
HCT VFR BLD AUTO: 38.4 % (ref 35.8–46.3)
HGB BLD-MCNC: 12.8 G/DL (ref 11.7–15.4)
MCH RBC QN AUTO: 24.7 PG (ref 26.1–32.9)
MCHC RBC AUTO-ENTMCNC: 33.3 G/DL (ref 31.4–35)
MCV RBC AUTO: 74.1 FL (ref 82–102)
NRBC # BLD: 0 K/UL (ref 0–0.2)
PLATELET # BLD AUTO: 275 K/UL (ref 150–450)
PMV BLD AUTO: 9.7 FL (ref 9.4–12.3)
POTASSIUM SERPL-SCNC: 3.5 MMOL/L (ref 3.5–5.1)
RBC # BLD AUTO: 5.18 M/UL (ref 4.05–5.2)
SODIUM SERPL-SCNC: 140 MMOL/L (ref 136–145)
WBC # BLD AUTO: 6.7 K/UL (ref 4.3–11.1)

## 2025-03-11 PROCEDURE — 3074F SYST BP LT 130 MM HG: CPT | Performed by: INTERNAL MEDICINE

## 2025-03-11 PROCEDURE — 3079F DIAST BP 80-89 MM HG: CPT | Performed by: INTERNAL MEDICINE

## 2025-03-11 PROCEDURE — 99203 OFFICE O/P NEW LOW 30 MIN: CPT | Performed by: INTERNAL MEDICINE

## 2025-03-11 RX ORDER — SODIUM CHLORIDE 0.9 % (FLUSH) 0.9 %
5-40 SYRINGE (ML) INJECTION EVERY 12 HOURS SCHEDULED
Status: CANCELLED | OUTPATIENT
Start: 2025-03-11

## 2025-03-11 RX ORDER — ASPIRIN 81 MG/1
81 TABLET, CHEWABLE ORAL ONCE
Status: CANCELLED | OUTPATIENT
Start: 2025-03-11 | End: 2025-03-11

## 2025-03-11 RX ORDER — SODIUM CHLORIDE 9 MG/ML
INJECTION, SOLUTION INTRAVENOUS PRN
Status: CANCELLED | OUTPATIENT
Start: 2025-03-11

## 2025-03-11 RX ORDER — SODIUM CHLORIDE 9 MG/ML
INJECTION, SOLUTION INTRAVENOUS CONTINUOUS
Status: CANCELLED | OUTPATIENT
Start: 2025-03-11

## 2025-03-11 RX ORDER — SODIUM CHLORIDE 0.9 % (FLUSH) 0.9 %
5-40 SYRINGE (ML) INJECTION PRN
Status: CANCELLED | OUTPATIENT
Start: 2025-03-11

## 2025-03-11 NOTE — PROGRESS NOTES
[facility] CARDIOLOGY  [address] [place] [address], [address]  [place] [address]  PHONE: [phone]        25        NAME:  Bonny Vasquez  : 1960  MRN: [id number]     CHIEF COMPLAINT:    Consultation, Follow-Up from Hospital, and Chest Pain         SUBJECTIVE:       ER referral for chest pain. Tightness. Off and on. + assoc left arm pain.    Phx:  Gerd  Depression  Migraines  Dyslipidemia    Fhx:  + mi    Shx:  Non smoker    Ros:  NC     Medications were all reviewed with the patient today and updated as necessary.   Current Outpatient Medications   Medication Sig    nitroGLYCERIN (NITROSTAT) 0.4 MG SL tablet Place 1 tablet under the tongue every 5 minutes as needed for Chest pain up to max of 3 total doses. If no relief after 1 dose, call 911.    atorvastatin (LIPITOR) 20 MG tablet Take 1 tablet by mouth daily    losartan (COZAAR) 50 MG tablet Take 1 tablet by mouth daily    promethazine (PHENERGAN) 25 MG tablet Take 1 tablet by mouth every 4 hours as needed for Nausea    FLUoxetine (PROZAC) 20 MG capsule Take 1 capsule by mouth daily    omeprazole (PRILOSEC) 40 MG delayed release capsule TAKE ONE CAPSULE BY MOUTH EVERY DAY    albuterol sulfate HFA (PROVENTIL HFA) 108 (90 Base) MCG/ACT inhaler Inhale 2 puffs into the lungs every 6 hours as needed for Wheezing    fluticasone (FLONASE) 50 MCG/ACT nasal spray 2 sprays in each nostril once a day. (Patient taking differently: daily as needed 2 sprays in each nostril once a day.)    amLODIPine (NORVASC) 5 MG tablet Take 1 tablet by mouth daily     No current facility-administered medications for this visit.        Allergies   Allergen Reactions    Codeine Swelling     throat    Sumatriptan Anaphylaxis    Crestor [Rosuvastatin] Other (See Comments)     arthralgias    Famotidine Nausea Only    Hydrocodone-Acetaminophen Nausea And Vomiting           PHYSICAL EXAM:     Wt Readings from Last 3 Encounters:   25 84.1 kg (185 lb 6.4 oz)   25 81.6

## 2025-03-12 PROBLEM — R07.2 PRECORDIAL CHEST PAIN: Status: ACTIVE | Noted: 2025-03-11

## 2025-03-27 NOTE — PROGRESS NOTES
Patient pre-assessment complete for [facility] scheduled for Dr Steel, arrival time 0900. Patient verified using . NPO status reinforced. Patient informed to take a full dose aspirin 325mg  or 81 mg x 4 on the day of procedure. Instructed they can take all other medications excluding vitamins & supplements. Patient verbalizes understanding of all instructions & denies any questions at this time.

## 2025-03-28 ENCOUNTER — HOSPITAL ENCOUNTER (OUTPATIENT)
Age: 65
Setting detail: OUTPATIENT SURGERY
Discharge: HOME | End: 2025-03-28
Attending: INTERNAL MEDICINE | Admitting: INTERNAL MEDICINE
Payer: COMMERCIAL

## 2025-03-28 VITALS
HEIGHT: 64 IN | TEMPERATURE: 98 F | WEIGHT: 180 LBS | BODY MASS INDEX: 30.73 KG/M2 | SYSTOLIC BLOOD PRESSURE: 109 MMHG | HEART RATE: 79 BPM | RESPIRATION RATE: 32 BRPM | OXYGEN SATURATION: 100 % | DIASTOLIC BLOOD PRESSURE: 70 MMHG

## 2025-03-28 DIAGNOSIS — R07.2 PRECORDIAL CHEST PAIN: ICD-10-CM

## 2025-03-28 LAB
ANION GAP SERPL CALC-SCNC: 10 MMOL/L (ref 7–16)
BUN SERPL-MCNC: 13 MG/DL (ref 8–23)
CALCIUM SERPL-MCNC: 9.5 MG/DL (ref 8.8–10.2)
CHLORIDE SERPL-SCNC: 106 MMOL/L (ref 98–107)
CO2 SERPL-SCNC: 25 MMOL/L (ref 20–29)
CREAT SERPL-MCNC: 0.81 MG/DL (ref 0.6–1.1)
ECHO BSA: 1.92 M2
EKG ATRIAL RATE: 78 BPM
EKG DIAGNOSIS: NORMAL
EKG P AXIS: 48 DEGREES
EKG P-R INTERVAL: 176 MS
EKG Q-T INTERVAL: 392 MS
EKG QRS DURATION: 78 MS
EKG QTC CALCULATION (BAZETT): 446 MS
EKG R AXIS: -12 DEGREES
EKG T AXIS: 37 DEGREES
EKG VENTRICULAR RATE: 78 BPM
ERYTHROCYTE [DISTWIDTH] IN BLOOD BY AUTOMATED COUNT: 14.2 % (ref 11.9–14.6)
GLUCOSE SERPL-MCNC: 88 MG/DL (ref 70–99)
HCT VFR BLD AUTO: 37.9 % (ref 35.8–46.3)
HGB BLD-MCNC: 12.5 G/DL (ref 11.7–15.4)
MCH RBC QN AUTO: 24.6 PG (ref 26.1–32.9)
MCHC RBC AUTO-ENTMCNC: 33 G/DL (ref 31.4–35)
MCV RBC AUTO: 74.5 FL (ref 82–102)
NRBC # BLD: 0 K/UL (ref 0–0.2)
PLATELET # BLD AUTO: 287 K/UL (ref 150–450)
PMV BLD AUTO: 9.5 FL (ref 9.4–12.3)
POTASSIUM SERPL-SCNC: 4 MMOL/L (ref 3.5–5.1)
RBC # BLD AUTO: 5.09 M/UL (ref 4.05–5.2)
SODIUM SERPL-SCNC: 142 MMOL/L (ref 136–145)
WBC # BLD AUTO: 6.1 K/UL (ref 4.3–11.1)

## 2025-03-28 PROCEDURE — 93010 ELECTROCARDIOGRAM REPORT: CPT | Performed by: INTERNAL MEDICINE

## 2025-03-28 PROCEDURE — 2500000003 HC RX 250 WO HCPCS: Performed by: INTERNAL MEDICINE

## 2025-03-28 PROCEDURE — 85027 COMPLETE CBC AUTOMATED: CPT

## 2025-03-28 PROCEDURE — 93458 L HRT ARTERY/VENTRICLE ANGIO: CPT | Performed by: INTERNAL MEDICINE

## 2025-03-28 PROCEDURE — 6360000002 HC RX W HCPCS: Performed by: INTERNAL MEDICINE

## 2025-03-28 PROCEDURE — C1769 GUIDE WIRE: HCPCS | Performed by: INTERNAL MEDICINE

## 2025-03-28 PROCEDURE — 93005 ELECTROCARDIOGRAM TRACING: CPT | Performed by: INTERNAL MEDICINE

## 2025-03-28 PROCEDURE — 2709999900 HC NON-CHARGEABLE SUPPLY: Performed by: INTERNAL MEDICINE

## 2025-03-28 PROCEDURE — 2580000003 HC RX 258: Performed by: INTERNAL MEDICINE

## 2025-03-28 PROCEDURE — 80048 BASIC METABOLIC PNL TOTAL CA: CPT

## 2025-03-28 PROCEDURE — 99152 MOD SED SAME PHYS/QHP 5/>YRS: CPT | Performed by: INTERNAL MEDICINE

## 2025-03-28 PROCEDURE — 6360000004 HC RX CONTRAST MEDICATION: Performed by: INTERNAL MEDICINE

## 2025-03-28 PROCEDURE — C1894 INTRO/SHEATH, NON-LASER: HCPCS | Performed by: INTERNAL MEDICINE

## 2025-03-28 RX ORDER — ASPIRIN 81 MG/1
324 TABLET, CHEWABLE ORAL ONCE
Status: DISCONTINUED | OUTPATIENT
Start: 2025-03-28 | End: 2025-03-28 | Stop reason: HOSPADM

## 2025-03-28 RX ORDER — LIDOCAINE HYDROCHLORIDE 10 MG/ML
INJECTION, SOLUTION INFILTRATION; PERINEURAL PRN
Status: DISCONTINUED | OUTPATIENT
Start: 2025-03-28 | End: 2025-03-28 | Stop reason: HOSPADM

## 2025-03-28 RX ORDER — SODIUM CHLORIDE 9 MG/ML
INJECTION, SOLUTION INTRAVENOUS CONTINUOUS
Status: DISCONTINUED | OUTPATIENT
Start: 2025-03-28 | End: 2025-03-28 | Stop reason: HOSPADM

## 2025-03-28 RX ORDER — CELECOXIB 200 MG/1
200 CAPSULE ORAL DAILY
Qty: 30 CAPSULE | Refills: 5 | Status: SHIPPED | OUTPATIENT
Start: 2025-03-28

## 2025-03-28 RX ORDER — MIDAZOLAM HYDROCHLORIDE 1 MG/ML
INJECTION, SOLUTION INTRAMUSCULAR; INTRAVENOUS PRN
Status: DISCONTINUED | OUTPATIENT
Start: 2025-03-28 | End: 2025-03-28 | Stop reason: HOSPADM

## 2025-03-28 RX ORDER — CELECOXIB 200 MG/1
200 CAPSULE ORAL DAILY
Status: DISCONTINUED | OUTPATIENT
Start: 2025-03-28 | End: 2025-03-28

## 2025-03-28 RX ORDER — IOPAMIDOL 755 MG/ML
INJECTION, SOLUTION INTRAVASCULAR PRN
Status: DISCONTINUED | OUTPATIENT
Start: 2025-03-28 | End: 2025-03-28 | Stop reason: HOSPADM

## 2025-03-28 RX ORDER — HEPARIN SODIUM 200 [USP'U]/100ML
INJECTION, SOLUTION INTRAVENOUS CONTINUOUS PRN
Status: COMPLETED | OUTPATIENT
Start: 2025-03-28 | End: 2025-03-28

## 2025-03-28 RX ADMIN — SODIUM CHLORIDE: 9 INJECTION, SOLUTION INTRAVENOUS at 09:02

## 2025-03-28 ASSESSMENT — PAIN SCALES - GENERAL: PAINLEVEL_OUTOF10: 0

## 2025-03-28 NOTE — PROGRESS NOTES
Report received from [facility] RN. Procedural finding communicated. Intra procedural medication administration reviewed. Progression of care discussed.    Patient received into CPRU room 2, Post [facility] w/ Dr Steel    Access site without bleeding or swelling. None noted    Patient instructed to limit movement of L upper extremity.    Routine post procedural vital signs & site assessment initiated.

## 2025-03-28 NOTE — PROGRESS NOTES
Discharge instructions reviewed with patient and . Prescriptions given for Celebrex and med info sheets provided for all new medications. Opportunity for questions provided. Patient and  voiced understanding of all discharge instructions.     L radial site C/D/I. Sling applied @ discharge.

## 2025-03-28 NOTE — PROGRESS NOTES
Patient received to CPRU room # 10  Ambulatory from [facility]. Patient scheduled for LHC today with Dr Steel. Procedure reviewed & questions answered, voiced good understanding consent obtained & placed on chart. All medications and medical history reviewed. Will prep patient per orders. Patient & family updated on plan of care.      The patient has a fraility score of 3-MANAGING WELL, based on ambulation. Patient took Aspirin 324 mg today at 0730 prior to arrival.

## 2025-03-28 NOTE — PROGRESS NOTES
TRANSFER - OUT REPORT:    Verbal report given to CHANDNI zaragoza on Bonny Vasquez  being transferred to CPRU for ordered procedure       Report consisted of patient’s Situation, Background, Assessment and Recommendations(SBAR).     Information from the following report(s) Procedure Summary, MAR, and Med Rec Status was reviewed with the receiving nurse.    Opportunity for questions and clarification was provided.      LHC by Dr. Steel  Left radial access  No interventions  Left wrist TR band with 10 mL  4 mg Versed  5,000 units of heparin  Access site soft. Clean, dry, and intact; with no signs of bleeding or hematoma  Pt. Tolerated procedure

## 2025-03-28 NOTE — DISCHARGE INSTRUCTIONS
HEART CATHETERIZATION/ANGIOGRAPHY DISCHARGE INSTRUCTIONS    Check puncture site frequently for swelling or bleeding. If there is any bleeding, apply pressure over the area with a clean towel or washcloth and call 911. Notify your doctor for any redness, swelling, drainage, or oozing from the puncture site. Notify your doctor for any fever or chills.  If the extremity becomes cold, numb, or painful call [facility] @ [phone].  Activity should be limited for the next 48 hours. No heavy lifting, pushing, pulling  or strenuous activity for 48 hours. No heavy lifting (anything over 10 pounds) for 3 days.  You may resume your usual diet. Drink more fluids than usual.  Have a responsible person drive you home and stay with you for at least 24 hours after your heart catheterization/angiography.  You may remove bandage from your L radial (wrist) in 24 hours. You may shower in 24 hours. No tub baths, hot tubs, or swimming for 1 week. Do not place any lotions, creams, powders, or ointments over puncture site for 1 week. You may place a clean band-aid over the puncture site each day for 5 days. Change daily.        Sedation for a Medical Procedure: Care Instructions     You were given a sedative medication during your visit. While many of the effects will have worn   off before you leave; you may continue to feel some effects for several hours.      Common side effects from sedation include:  Feeling sleepy. (Your doctors and nurses will make sure you are not too sleepy to go home.)  Nausea and vomiting. This usually does not last long.  Feeling tired.     How can you care for yourself at home?  Activity    Don't do anything for 24 hours that requires attention to detail. It takes time for the medicine effects to completely wear off.     Do not make important legal decisions for 24 hours.     Do not sign any legal documents for 24 hours.     Do not drink alcohol today     For your safety, you should not drive or

## 2025-05-28 ENCOUNTER — OFFICE VISIT (OUTPATIENT)
Dept: FAMILY MEDICINE CLINIC | Facility: CLINIC | Age: 65
End: 2025-05-28
Payer: COMMERCIAL

## 2025-05-28 VITALS
TEMPERATURE: 97.5 F | RESPIRATION RATE: 16 BRPM | DIASTOLIC BLOOD PRESSURE: 69 MMHG | OXYGEN SATURATION: 99 % | SYSTOLIC BLOOD PRESSURE: 135 MMHG | BODY MASS INDEX: 32.1 KG/M2 | HEIGHT: 64 IN | WEIGHT: 188 LBS | HEART RATE: 80 BPM

## 2025-05-28 DIAGNOSIS — K21.9 GASTROESOPHAGEAL REFLUX DISEASE WITHOUT ESOPHAGITIS: ICD-10-CM

## 2025-05-28 DIAGNOSIS — R11.0 NAUSEA: ICD-10-CM

## 2025-05-28 DIAGNOSIS — I10 HYPERTENSION, UNSPECIFIED TYPE: ICD-10-CM

## 2025-05-28 DIAGNOSIS — E78.00 HIGH CHOLESTEROL: ICD-10-CM

## 2025-05-28 DIAGNOSIS — Z12.11 SCREENING FOR COLON CANCER: ICD-10-CM

## 2025-05-28 DIAGNOSIS — F33.1 MODERATE EPISODE OF RECURRENT MAJOR DEPRESSIVE DISORDER (HCC): ICD-10-CM

## 2025-05-28 DIAGNOSIS — I25.118 CORONARY ARTERY DISEASE OF NATIVE ARTERY OF NATIVE HEART WITH STABLE ANGINA PECTORIS: Primary | ICD-10-CM

## 2025-05-28 DIAGNOSIS — I10 PRIMARY HYPERTENSION: ICD-10-CM

## 2025-05-28 DIAGNOSIS — Z12.31 ENCOUNTER FOR SCREENING MAMMOGRAM FOR MALIGNANT NEOPLASM OF BREAST: ICD-10-CM

## 2025-05-28 PROBLEM — R07.2 PRECORDIAL CHEST PAIN: Status: RESOLVED | Noted: 2025-03-11 | Resolved: 2025-05-28

## 2025-05-28 PROCEDURE — 3078F DIAST BP <80 MM HG: CPT | Performed by: FAMILY MEDICINE

## 2025-05-28 PROCEDURE — 3075F SYST BP GE 130 - 139MM HG: CPT | Performed by: FAMILY MEDICINE

## 2025-05-28 PROCEDURE — 99214 OFFICE O/P EST MOD 30 MIN: CPT | Performed by: FAMILY MEDICINE

## 2025-05-28 RX ORDER — PRAVASTATIN SODIUM 40 MG
40 TABLET ORAL EVERY EVENING
Qty: 90 TABLET | Refills: 3 | Status: SHIPPED | OUTPATIENT
Start: 2025-05-28

## 2025-05-28 RX ORDER — NITROGLYCERIN 0.4 MG/1
0.4 TABLET SUBLINGUAL EVERY 5 MIN PRN
Qty: 25 TABLET | Refills: 3 | Status: SHIPPED | OUTPATIENT
Start: 2025-05-28

## 2025-05-28 RX ORDER — PROMETHAZINE HYDROCHLORIDE 25 MG/1
25 TABLET ORAL EVERY 4 HOURS PRN
Qty: 20 TABLET | Refills: 5 | Status: SHIPPED | OUTPATIENT
Start: 2025-05-28

## 2025-05-28 RX ORDER — OMEPRAZOLE 40 MG/1
CAPSULE, DELAYED RELEASE ORAL
Qty: 90 CAPSULE | Refills: 3 | Status: SHIPPED | OUTPATIENT
Start: 2025-05-28

## 2025-05-28 RX ORDER — AMLODIPINE BESYLATE 5 MG/1
5 TABLET ORAL DAILY
Qty: 90 TABLET | Refills: 3 | Status: SHIPPED | OUTPATIENT
Start: 2025-05-28

## 2025-05-28 RX ORDER — CELECOXIB 200 MG/1
200 CAPSULE ORAL DAILY
Qty: 30 CAPSULE | Refills: 5 | Status: SHIPPED | OUTPATIENT
Start: 2025-05-28

## 2025-05-28 RX ORDER — ALBUTEROL SULFATE 90 UG/1
2 INHALANT RESPIRATORY (INHALATION) EVERY 6 HOURS PRN
Qty: 1 EACH | Refills: 3 | Status: SHIPPED | OUTPATIENT
Start: 2025-05-28

## 2025-05-28 RX ORDER — LOSARTAN POTASSIUM 50 MG/1
50 TABLET ORAL DAILY
Qty: 90 TABLET | Refills: 3 | Status: SHIPPED | OUTPATIENT
Start: 2025-05-28

## 2025-05-28 SDOH — ECONOMIC STABILITY: FOOD INSECURITY: WITHIN THE PAST 12 MONTHS, THE FOOD YOU BOUGHT JUST DIDN'T LAST AND YOU DIDN'T HAVE MONEY TO GET MORE.: NEVER TRUE

## 2025-05-28 SDOH — ECONOMIC STABILITY: FOOD INSECURITY: WITHIN THE PAST 12 MONTHS, YOU WORRIED THAT YOUR FOOD WOULD RUN OUT BEFORE YOU GOT MONEY TO BUY MORE.: NEVER TRUE

## 2025-05-28 ASSESSMENT — PATIENT HEALTH QUESTIONNAIRE - PHQ9
SUM OF ALL RESPONSES TO PHQ QUESTIONS 1-9: 0
3. TROUBLE FALLING OR STAYING ASLEEP: NOT AT ALL
4. FEELING TIRED OR HAVING LITTLE ENERGY: NOT AT ALL
7. TROUBLE CONCENTRATING ON THINGS, SUCH AS READING THE NEWSPAPER OR WATCHING TELEVISION: NOT AT ALL
SUM OF ALL RESPONSES TO PHQ QUESTIONS 1-9: 0
5. POOR APPETITE OR OVEREATING: NOT AT ALL
SUM OF ALL RESPONSES TO PHQ QUESTIONS 1-9: 0
2. FEELING DOWN, DEPRESSED OR HOPELESS: NOT AT ALL
10. IF YOU CHECKED OFF ANY PROBLEMS, HOW DIFFICULT HAVE THESE PROBLEMS MADE IT FOR YOU TO DO YOUR WORK, TAKE CARE OF THINGS AT HOME, OR GET ALONG WITH OTHER PEOPLE: NOT DIFFICULT AT ALL
8. MOVING OR SPEAKING SO SLOWLY THAT OTHER PEOPLE COULD HAVE NOTICED. OR THE OPPOSITE, BEING SO FIGETY OR RESTLESS THAT YOU HAVE BEEN MOVING AROUND A LOT MORE THAN USUAL: NOT AT ALL
9. THOUGHTS THAT YOU WOULD BE BETTER OFF DEAD, OR OF HURTING YOURSELF: NOT AT ALL
6. FEELING BAD ABOUT YOURSELF - OR THAT YOU ARE A FAILURE OR HAVE LET YOURSELF OR YOUR FAMILY DOWN: NOT AT ALL
1. LITTLE INTEREST OR PLEASURE IN DOING THINGS: NOT AT ALL
SUM OF ALL RESPONSES TO PHQ QUESTIONS 1-9: 0

## 2025-05-28 NOTE — PROGRESS NOTES
major depressive disorder (HCC)  -     FLUoxetine (PROZAC) 20 MG capsule; Take 1 capsule by mouth daily    Gastroesophageal reflux disease without esophagitis  -     omeprazole (PRILOSEC) 40 MG delayed release capsule; TAKE ONE CAPSULE BY MOUTH EVERY DAY    Nausea  -     promethazine (PHENERGAN) 25 MG tablet; Take 1 tablet by mouth every 4 hours as needed for Nausea    Screening for colon cancer  -     [facility] - Michel Wolff [facility], G[facility], [facility] [place]    Primary hypertension  -     losartan (COZAAR) 50 MG tablet; Take 1 tablet by mouth daily    High cholesterol  -     pravastatin (PRAVACHOL) 40 MG tablet; Take 1 tablet by mouth every evening    Encounter for screening mammogram for malignant neoplasm of breast  -     [facility] DIGITAL SCREEN BILATERAL; Future    Hypertension, unspecified type  -     amLODIPine (NORVASC) 5 MG tablet; Take 1 tablet by mouth daily    Other orders  -     albuterol sulfate HFA (PROVENTIL HFA) 108 (90 Base) MCG/ACT inhaler; Inhale 2 puffs into the lungs every 6 hours as needed for Wheezing  -     celecoxib (CELEBREX) 200 MG capsule; Take 1 capsule by mouth daily        Karol Conrad MD    The patient (or guardian, if applicable) and other individuals in attendance with the patient were advised that Artificial Intelligence will be utilized during this visit to record, process the conversation to generate a clinical note, and support improvement of the AI technology. The patient (or guardian, if applicable) and other individuals in attendance at the appointment consented to the use of AI, including the recording.

## 2025-06-12 ENCOUNTER — CLINICAL DOCUMENTATION (OUTPATIENT)
Dept: ORTHOPEDIC SURGERY | Age: 65
End: 2025-06-12

## (undated) DEVICE — GUIDEWIRE 035IN 210CM PTFE COAT FIX COR J TIP 15MM FIRM BODY

## (undated) DEVICE — CATHETER COR DIAG JUDKINS R 5.0 CRV 5FR 100CM 0 SIDE H

## (undated) DEVICE — BAND COMPR L24CM REG CLR PLAS HEMSTAT EXT HK AND LOOP RETEN

## (undated) DEVICE — SUTURE PERMAHAND SZ 2-0 L30IN NONABSORBABLE BLK L17MM BB K883H

## (undated) DEVICE — CATHETER DIAG 5FR L100CM LUMN ID0.047IN JL3.5 CRV 0 SIDE H

## (undated) DEVICE — CATHETER DIAG 5FR L110CM PIG CRV SZ 6 SIDE H DBL BRAID WIRE

## (undated) DEVICE — GLIDESHEATH SLENDER STAINLESS STEEL KIT: Brand: GLIDESHEATH SLENDER